# Patient Record
Sex: FEMALE | Race: WHITE | NOT HISPANIC OR LATINO | Employment: FULL TIME | URBAN - METROPOLITAN AREA
[De-identification: names, ages, dates, MRNs, and addresses within clinical notes are randomized per-mention and may not be internally consistent; named-entity substitution may affect disease eponyms.]

---

## 2018-03-26 ENCOUNTER — TRANSFERRED RECORDS (OUTPATIENT)
Dept: HEALTH INFORMATION MANAGEMENT | Facility: CLINIC | Age: 28
End: 2018-03-26

## 2018-10-22 ENCOUNTER — HOSPITAL (OUTPATIENT)
Dept: OTHER | Age: 28
End: 2018-10-22
Attending: EMERGENCY MEDICINE

## 2018-10-22 LAB — HCG POINT OF CARE (5HGRST): NEGATIVE

## 2021-12-15 ENCOUNTER — TRANSFERRED RECORDS (OUTPATIENT)
Dept: HEALTH INFORMATION MANAGEMENT | Facility: CLINIC | Age: 31
End: 2021-12-15
Payer: COMMERCIAL

## 2021-12-16 DIAGNOSIS — G90.A POTS (POSTURAL ORTHOSTATIC TACHYCARDIA SYNDROME): Primary | ICD-10-CM

## 2021-12-17 ENCOUNTER — TRANSFERRED RECORDS (OUTPATIENT)
Dept: HEALTH INFORMATION MANAGEMENT | Facility: CLINIC | Age: 31
End: 2021-12-17
Payer: COMMERCIAL

## 2022-02-02 ENCOUNTER — OFFICE VISIT (OUTPATIENT)
Dept: CARDIOLOGY | Facility: CLINIC | Age: 32
End: 2022-02-02
Payer: COMMERCIAL

## 2022-02-02 VITALS
HEART RATE: 106 BPM | SYSTOLIC BLOOD PRESSURE: 137 MMHG | BODY MASS INDEX: 22.66 KG/M2 | HEIGHT: 65 IN | DIASTOLIC BLOOD PRESSURE: 84 MMHG | WEIGHT: 136 LBS

## 2022-02-02 DIAGNOSIS — R01.1 SYSTOLIC MURMUR: ICD-10-CM

## 2022-02-02 DIAGNOSIS — R00.2 PALPITATIONS: Primary | ICD-10-CM

## 2022-02-02 DIAGNOSIS — G90.A POTS (POSTURAL ORTHOSTATIC TACHYCARDIA SYNDROME): ICD-10-CM

## 2022-02-02 PROCEDURE — 99204 OFFICE O/P NEW MOD 45 MIN: CPT | Performed by: INTERNAL MEDICINE

## 2022-02-02 PROCEDURE — 93000 ELECTROCARDIOGRAM COMPLETE: CPT | Performed by: INTERNAL MEDICINE

## 2022-02-02 RX ORDER — ONDANSETRON 8 MG/1
TABLET, FILM COATED ORAL EVERY 8 HOURS PRN
COMMUNITY

## 2022-02-02 RX ORDER — ATORVASTATIN CALCIUM 20 MG/1
TABLET, FILM COATED ORAL
COMMUNITY
Start: 2020-03-06

## 2022-02-02 RX ORDER — FEXOFENADINE HCL 180 MG/1
180 TABLET ORAL DAILY
COMMUNITY
End: 2022-05-03

## 2022-02-02 RX ORDER — SULFASALAZINE 500 MG/1
500 TABLET, DELAYED RELEASE ORAL 4 TIMES DAILY
COMMUNITY

## 2022-02-02 RX ORDER — FOLIC ACID 1 MG/1
1 TABLET ORAL DAILY
COMMUNITY
End: 2022-05-03 | Stop reason: DRUGHIGH

## 2022-02-02 RX ORDER — LANOLIN ALCOHOL/MO/W.PET/CERES
100 CREAM (GRAM) TOPICAL DAILY
COMMUNITY

## 2022-02-02 RX ORDER — SENNOSIDES 8.6 MG
650 CAPSULE ORAL EVERY 8 HOURS PRN
COMMUNITY

## 2022-02-02 RX ORDER — SUCRALFATE 1 G/1
1 TABLET ORAL 4 TIMES DAILY PRN
COMMUNITY

## 2022-02-02 RX ORDER — UBROGEPANT 100 MG/1
100 TABLET ORAL
COMMUNITY

## 2022-02-02 RX ORDER — LEVOTHYROXINE SODIUM 75 UG/1
75 TABLET ORAL DAILY
COMMUNITY
End: 2022-05-03 | Stop reason: DRUGHIGH

## 2022-02-02 RX ORDER — CEVIMELINE HYDROCHLORIDE 30 MG/1
30 CAPSULE ORAL 2 TIMES DAILY
COMMUNITY

## 2022-02-02 RX ORDER — LIOTHYRONINE SODIUM 5 UG/1
5 TABLET ORAL DAILY
COMMUNITY

## 2022-02-02 RX ORDER — LEVALBUTEROL TARTRATE 45 UG/1
2 AEROSOL, METERED ORAL
COMMUNITY

## 2022-02-02 RX ORDER — BACLOFEN 10 MG/1
10 TABLET ORAL 2 TIMES DAILY
COMMUNITY

## 2022-02-02 ASSESSMENT — MIFFLIN-ST. JEOR: SCORE: 1332.77

## 2022-02-02 NOTE — LETTER
2/2/2022    Rian Chavez MD  [address]  [address]  [facility] [address]    RE: Shauna Stout       Dear Colleague,     I had the pleasure of seeing Shauna Stout in the [facility].  HPI and Plan:   See dictation    Orders Placed This Encounter   Procedures     Follow-Up with Cardiology     EKG 12-lead complete w/read - Clinics (performed today)     Echocardiogram Complete     Orders Placed This Encounter   Medications     levothyroxine (SYNTHROID/LEVOTHROID) 75 MCG tablet     Sig: Take 75 mcg by mouth daily     liothyronine (CYTOMEL) 5 MCG tablet     Sig: Take 5 mcg by mouth daily     fluticasone-salmeterol (ADVAIR) 500-50 MCG/DOSE inhaler     Sig: Inhale 1 puff into the lungs every 12 hours     sulfaSALAzine ER (AZULFIDINE EN) 500 MG EC tablet     Sig: Take 500 mg by mouth 4 times daily Two tabs, twce daily     baclofen (LIORESAL) 10 MG tablet     Sig: Take 10 mg by mouth 2 times daily     cevimeline (EVOXAC) 30 MG capsule     Sig: Take 30 mg by mouth 2 times daily     fexofenadine (ALLEGRA ALLERGY) 180 MG tablet     Sig: Take 180 mg by mouth daily     atorvastatin (LIPITOR) 20 MG tablet     Sig: TK 1 T PO QD     levalbuterol (XOPENEX HFA) 45 MCG/ACT inhaler     Sig: Inhale 2 puffs into the lungs     ubrogepant (UBRELVY) 100 MG tablet     Sig: Take 100 mg by mouth at onset of headache     ondansetron (ZOFRAN) 8 MG tablet     Sig: Take by mouth every 8 hours as needed for nausea     aspirin-acetaminophen-caffeine (EXCEDRIN MIGRAINE) 250-250-65 MG tablet     Sig: Take 1 tablet by mouth every 6 hours as needed for headaches     KETOROLAC-BUPIV-KETAMINE IJ     Sig: Inject as directed daily as needed     sucralfate (CARAFATE) 1 GM tablet     Sig: Take 1 g by mouth 4 times daily as needed     acetaminophen (TYLENOL) 650 MG CR tablet     Sig: Take 650 mg by mouth every 8 hours as needed for mild pain or fever     Menaquinone-7 (VITAMIN K2 PO)     Sig: Take by mouth daily      Cholecalciferol (VITAMIN D3 PO)     Sig: Take by mouth daily     CYANOCOBALAMIN PO     Sig: Take 250 mcg by mouth daily     RIBOFLAVIN PO     Sig: Take 50 mg by mouth daily     folic acid (FOLVITE) 1 MG tablet     Sig: Take 1 mg by mouth daily     thiamine (B-1) 100 MG tablet     Sig: Take 100 mg by mouth daily     Ascorbic Acid (BRENTON-C PO)     Sig: Take 200 mg by mouth daily     Flaxseed, Linseed, (FLAXSEED OIL PO)     Sig: Take by mouth daily     Glycerin-Polysorbate 80 (REFRESH DRY EYE THERAPY OP)     Sig: Apply to eye daily as needed     There are no discontinued medications.      Encounter Diagnoses   Name Primary?     POTS (postural orthostatic tachycardia syndrome)      Palpitations Yes     Systolic murmur        CURRENT MEDICATIONS:  Current Outpatient Medications   Medication Sig Dispense Refill     acetaminophen (TYLENOL) 650 MG CR tablet Take 650 mg by mouth every 8 hours as needed for mild pain or fever       Ascorbic Acid (BRENTON-C PO) Take 200 mg by mouth daily       aspirin-acetaminophen-caffeine (EXCEDRIN MIGRAINE) 250-250-65 MG tablet Take 1 tablet by mouth every 6 hours as needed for headaches       atorvastatin (LIPITOR) 20 MG tablet TK 1 T PO QD       baclofen (LIORESAL) 10 MG tablet Take 10 mg by mouth 2 times daily       cevimeline (EVOXAC) 30 MG capsule Take 30 mg by mouth 2 times daily       Cholecalciferol (VITAMIN D3 PO) Take by mouth daily       CYANOCOBALAMIN PO Take 250 mcg by mouth daily       fexofenadine (ALLEGRA ALLERGY) 180 MG tablet Take 180 mg by mouth daily       Flaxseed, Linseed, (FLAXSEED OIL PO) Take by mouth daily       fluticasone-salmeterol (ADVAIR) 500-50 MCG/DOSE inhaler Inhale 1 puff into the lungs every 12 hours       folic acid (FOLVITE) 1 MG tablet Take 1 mg by mouth daily       Glycerin-Polysorbate 80 (REFRESH DRY EYE THERAPY OP) Apply to eye daily as needed       KETOROLAC-BUPIV-KETAMINE IJ Inject as directed daily as needed       levalbuterol (XOPENEX HFA) 45  MCG/ACT inhaler Inhale 2 puffs into the lungs       levothyroxine (SYNTHROID/LEVOTHROID) 75 MCG tablet Take 75 mcg by mouth daily       liothyronine (CYTOMEL) 5 MCG tablet Take 5 mcg by mouth daily       Menaquinone-7 (VITAMIN K2 PO) Take by mouth daily       ondansetron (ZOFRAN) 8 MG tablet Take by mouth every 8 hours as needed for nausea       RIBOFLAVIN PO Take 50 mg by mouth daily       sucralfate (CARAFATE) 1 GM tablet Take 1 g by mouth 4 times daily as needed       sulfaSALAzine ER (AZULFIDINE EN) 500 MG EC tablet Take 500 mg by mouth 4 times daily Two tabs, twce daily       thiamine (B-1) 100 MG tablet Take 100 mg by mouth daily       ubrogepant (UBRELVY) 100 MG tablet Take 100 mg by mouth at onset of headache         ALLERGIES   No Known Allergies    PAST MEDICAL HISTORY:  Past Medical History:   Diagnosis Date     Anxiety      Asthma      Duodenal ulcer without hemorrhage or perforation and without obstruction     due to nsaid     Familial hypercholesterolemia      Fibroid uterus      Hypothyroidism     on synthroid     MCTD (mixed connective tissue disease) (H)      Migraine      Raynaud's syndrome      Sjogren's disease (H)        PAST SURGICAL HISTORY:  Past Surgical History:   Procedure Laterality Date     eye tear duct plug       TONSILLECTOMY       TYMPANOPLASTY         FAMILY HISTORY:  Family History   Problem Relation Age of Onset     Hyperlipidemia Mother      Hypertension Mother      Thyroid Disease Mother      Tremor Mother      Chronic Obstructive Pulmonary Disease Father      Aneurysm Father        SOCIAL HISTORY:  Social History     Socioeconomic History     Marital status: Single     Spouse name: None     Number of children: None     Years of education: None     Highest education level: None   Occupational History     Comment: soft ware developement   Tobacco Use     Smoking status: Never Smoker     Smokeless tobacco: Never Used   Substance and Sexual Activity     Alcohol use: Yes      "Comment: occassional     Drug use: None     Comment: caffeine 1/d  chocolate 1/d     Sexual activity: None   Other Topics Concern     Parent/sibling w/ CABG, MI or angioplasty before 65F 55M? Not Asked   Social History Narrative     None     Social Determinants of Health     Financial Resource Strain: Not on file   Food Insecurity: Not on file   Transportation Needs: Not on file   Physical Activity: Not on file   Stress: Not on file   Social Connections: Not on file   Intimate Partner Violence: Not on file   Housing Stability: Not on file       Review of Systems:  Skin:        Eyes:  Positive for glasses  ENT:       Respiratory:  Positive for shortness of breath  Cardiovascular:  chest pain;Negative for Positive for;palpitations;lightheadedness  Gastroenterology: Negative    Genitourinary:  Negative    Musculoskeletal:  Negative    Neurologic:  Positive for headaches;migraine headaches  Psychiatric:  Negative    Heme/Lymph/Imm:  Negative    Endocrine:  Positive for thyroid disorder    Physical Exam:  Vitals: /84   Pulse 106   Ht 1.651 m (5' 5\")   Wt 61.7 kg (136 lb)   BMI 22.63 kg/m      Constitutional:  cooperative, alert and oriented, well developed, well nourished, in no acute distress        Skin:  warm and dry to the touch, no apparent skin lesions or masses noted          Head:  normocephalic, no masses or lesions        Eyes:  pupils equal and round, conjunctivae and lids unremarkable, sclera white, no xanthalasma, EOMS intact, no nystagmus        Lymph:No Cervical lymphadenopathy present;No thyromegaly     ENT:           Neck:  carotid pulses are full and equal bilaterally, JVP normal, no carotid bruit        Respiratory:  normal breath sounds, clear to auscultation, normal A-P diameter, normal symmetry, normal respiratory excursion, no use of accessory muscles         Cardiac: regular rhythm;normal S1 and S2       systolic ejection murmur     systolic murmur at pulmonic area supine but not heard " sitting up  pulses full and equal, no bruits auscultated                                        GI:  abdomen soft, non-tender, BS normoactive, no mass, no HSM, no bruits        Extremities and Muscular Skeletal:  no deformities, clubbing, cyanosis, erythema observed;no edema              Neurological:  no gross motor deficits        Psych:  Alert and Oriented x 3      Recent Lab Results:  LIPID RESULTS:  No results found for: CHOL, HDL, LDL, TRIG, CHOLHDLRATIO    LIVER ENZYME RESULTS:  No results found for: AST, ALT    CBC RESULTS:  No results found for: WBC, RBC, HGB, HCT, MCV, MCH, MCHC, RDW, PLT    BMP RESULTS:  No results found for: NA, POTASSIUM, CHLORIDE, CO2, ANIONGAP, GLC, BUN, CR, GFRESTIMATED, GFRESTBLACK, ERICKSON     A1C RESULTS:  No results found for: A1C    INR RESULTS:  No results found for: INR        CC  Rian Chavez MD  [address]  [address]  [place] [address]      Thank you for allowing me to participate in the care of your patient.      Sincerely,     Laureano Hanna MD     [facility]  cc:   Rian Chavez MD  [address]  [address]  [place] [address]

## 2022-02-02 NOTE — PROGRESS NOTES
Service Date: 02/02/2022    Shauna Stout is a most delightful 31-year-old woman.  She used to live in [place].  She was referred in for possible POTS syndrome.  She has a longstanding history of a mixed connective tissue disease or similar.  She has had occasional lightheaded spells but no actual syncope going back to her youth.  She had been seen in [place] with a cardiologist.  She had an echocardiogram back in 2018 that showed no structural heart disease and important as no connection with the heart.  No valvular disease and no myxoma, which can sometimes be associated with collagen vascular symptoms.  The patient states that sometimes when she is walking downtown [place] she will feel lightheaded and will have to sit down.  She notes if she sits down right away her symptoms will pass. Again, there has been no gayatri syncope.  She has completely normal exercise tolerance.  No real irregular heartbeat except for the higher heartbeat when she is upright.  No fluid retention, etc.  Her past medical history has been outlined.  The pertinent findings on exam is clinically I do hear a soft murmur over the pulmonic area when she is supine but not sitting up.  This is most consistent with a pulmonary outflow murmur.  Supine left arm blood pressure 124/71, heart rate 84.  Standing at 1 minute, blood pressure 119/70 and at 2 minutes 112/70 with a heart rate of 107.  Interestingly, though, at 5 minutes, I rechecked the left and the right arm.  The left arm was 138/74 and the right arm was 140/70 with a heart rate of 108, so therefore, her blood pressure is similar in both arms.  She did have a slight relative tachycardia with her heart rate going up about 23-24 point above resting supine when she is standing.  The blood pressure when it dropped was really minimal, but her blood pressure actually went up a little higher after several minutes.  She was not particularly symptomatic with any of this.  Extensive work was done  through [facility] and I reviewed all those tests.  There was a significant amount of blood work done and I thank them in advance.  As I mentioned, I reviewed the echocardiogram from [place] and the local blood tests here have been quite extensive. Briefly, iron studies are in the normal range.  Cholesterol panel is in the normal range, even though they list hyperlipidemia as a diagnosis.  Her LDL was 97, HDL 65, triglycerides 60.  Her blood sugar was normal.  Electrolyte panel including creatinine is normal.  Liver panel and albumin are normal.  PTH normal.  TSH was minimally elevated at 5.6 with a normal T3 and T4, so at most, this is compensated hypothyroidism, ACTH, histamine and cortisol were done to excluded adrenal insufficiency or overactive adrenal gland.  Multiple vitamins were done, and for the most part, vitamin studies were all normal.  At this point, we can give her a tentative diagnosis of POTS syndrome.  Her heart rate did go up more than 20 points to over 100 with only a mild change in the blood pressure, although this is a relatively mild case.  Her episodes of lightheadedness and feeling that she has to stop are actually few and far between.  We talked about the usual standard maneuvers if she feels lightheaded to simply sit down or if she can and is upright to march in place to increase the venous return.  We talked about some of the pathophysiology of POTS.  We talked about potential therapies that include Florinef, beta blockers, Mestinon, Corlanor, support stockings, salt.  At this point, I would either do nothing or liberalize salt in her diet and see how she feels.  The episodes are so far in between I do not know if it is necessary to put medications on regularly for symptoms that are relatively few and far between.  I would like to see her back in 3 months.  I will do an echocardiogram just to make sure that murmur is an innocent murmur.  We will also do a bubble study to exclude a  PFO.  She does have a history of migraine headache.  I am certain that she would not have a myxoma. They would not have missed that on the previous echo, but I only bring that up because myxomas often can mimic collagen vascular symptoms.    Today's visit was 54 minutes, including the time to review the outside records.    Laureano Hanna MD    cc:  Rian Chavez MD  [facility]  [address] [place] [address] [facility], [address] [place]  [address]      Laureano Hanna MD        D: 2022   T: 2022   MT: emil    Name:     DANIELLA MARIN  MRN:      [id number]        Account:      [id number]   :      1990           Service Date: 2022       Document: [id number]

## 2022-02-02 NOTE — PROGRESS NOTES
HPI and Plan:   See dictation    Orders Placed This Encounter   Procedures     Follow-Up with Cardiology     EKG 12-lead complete w/read - Clinics (performed today)     Echocardiogram Complete     Orders Placed This Encounter   Medications     levothyroxine (SYNTHROID/LEVOTHROID) 75 MCG tablet     Sig: Take 75 mcg by mouth daily     liothyronine (CYTOMEL) 5 MCG tablet     Sig: Take 5 mcg by mouth daily     fluticasone-salmeterol (ADVAIR) 500-50 MCG/DOSE inhaler     Sig: Inhale 1 puff into the lungs every 12 hours     sulfaSALAzine ER (AZULFIDINE EN) 500 MG EC tablet     Sig: Take 500 mg by mouth 4 times daily Two tabs, twce daily     baclofen (LIORESAL) 10 MG tablet     Sig: Take 10 mg by mouth 2 times daily     cevimeline (EVOXAC) 30 MG capsule     Sig: Take 30 mg by mouth 2 times daily     fexofenadine (ALLEGRA ALLERGY) 180 MG tablet     Sig: Take 180 mg by mouth daily     atorvastatin (LIPITOR) 20 MG tablet     Sig: TK 1 T PO QD     levalbuterol (XOPENEX HFA) 45 MCG/ACT inhaler     Sig: Inhale 2 puffs into the lungs     ubrogepant (UBRELVY) 100 MG tablet     Sig: Take 100 mg by mouth at onset of headache     ondansetron (ZOFRAN) 8 MG tablet     Sig: Take by mouth every 8 hours as needed for nausea     aspirin-acetaminophen-caffeine (EXCEDRIN MIGRAINE) 250-250-65 MG tablet     Sig: Take 1 tablet by mouth every 6 hours as needed for headaches     KETOROLAC-BUPIV-KETAMINE IJ     Sig: Inject as directed daily as needed     sucralfate (CARAFATE) 1 GM tablet     Sig: Take 1 g by mouth 4 times daily as needed     acetaminophen (TYLENOL) 650 MG CR tablet     Sig: Take 650 mg by mouth every 8 hours as needed for mild pain or fever     Menaquinone-7 (VITAMIN K2 PO)     Sig: Take by mouth daily     Cholecalciferol (VITAMIN D3 PO)     Sig: Take by mouth daily     CYANOCOBALAMIN PO     Sig: Take 250 mcg by mouth daily     RIBOFLAVIN PO     Sig: Take 50 mg by mouth daily     folic acid (FOLVITE) 1 MG tablet     Sig: Take 1 mg  by mouth daily     thiamine (B-1) 100 MG tablet     Sig: Take 100 mg by mouth daily     Ascorbic Acid (BRENTON-C PO)     Sig: Take 200 mg by mouth daily     Flaxseed, Linseed, (FLAXSEED OIL PO)     Sig: Take by mouth daily     Glycerin-Polysorbate 80 (REFRESH DRY EYE THERAPY OP)     Sig: Apply to eye daily as needed     There are no discontinued medications.      Encounter Diagnoses   Name Primary?     POTS (postural orthostatic tachycardia syndrome)      Palpitations Yes     Systolic murmur        CURRENT MEDICATIONS:  Current Outpatient Medications   Medication Sig Dispense Refill     acetaminophen (TYLENOL) 650 MG CR tablet Take 650 mg by mouth every 8 hours as needed for mild pain or fever       Ascorbic Acid (BRENTON-C PO) Take 200 mg by mouth daily       aspirin-acetaminophen-caffeine (EXCEDRIN MIGRAINE) 250-250-65 MG tablet Take 1 tablet by mouth every 6 hours as needed for headaches       atorvastatin (LIPITOR) 20 MG tablet TK 1 T PO QD       baclofen (LIORESAL) 10 MG tablet Take 10 mg by mouth 2 times daily       cevimeline (EVOXAC) 30 MG capsule Take 30 mg by mouth 2 times daily       Cholecalciferol (VITAMIN D3 PO) Take by mouth daily       CYANOCOBALAMIN PO Take 250 mcg by mouth daily       fexofenadine (ALLEGRA ALLERGY) 180 MG tablet Take 180 mg by mouth daily       Flaxseed, Linseed, (FLAXSEED OIL PO) Take by mouth daily       fluticasone-salmeterol (ADVAIR) 500-50 MCG/DOSE inhaler Inhale 1 puff into the lungs every 12 hours       folic acid (FOLVITE) 1 MG tablet Take 1 mg by mouth daily       Glycerin-Polysorbate 80 (REFRESH DRY EYE THERAPY OP) Apply to eye daily as needed       KETOROLAC-BUPIV-KETAMINE IJ Inject as directed daily as needed       levalbuterol (XOPENEX HFA) 45 MCG/ACT inhaler Inhale 2 puffs into the lungs       levothyroxine (SYNTHROID/LEVOTHROID) 75 MCG tablet Take 75 mcg by mouth daily       liothyronine (CYTOMEL) 5 MCG tablet Take 5 mcg by mouth daily       Menaquinone-7 (VITAMIN K2 PO)  Take by mouth daily       ondansetron (ZOFRAN) 8 MG tablet Take by mouth every 8 hours as needed for nausea       RIBOFLAVIN PO Take 50 mg by mouth daily       sucralfate (CARAFATE) 1 GM tablet Take 1 g by mouth 4 times daily as needed       sulfaSALAzine ER (AZULFIDINE EN) 500 MG EC tablet Take 500 mg by mouth 4 times daily Two tabs, twce daily       thiamine (B-1) 100 MG tablet Take 100 mg by mouth daily       ubrogepant (UBRELVY) 100 MG tablet Take 100 mg by mouth at onset of headache         ALLERGIES   No Known Allergies    PAST MEDICAL HISTORY:  Past Medical History:   Diagnosis Date     Anxiety      Asthma      Duodenal ulcer without hemorrhage or perforation and without obstruction     due to nsaid     Familial hypercholesterolemia      Fibroid uterus      Hypothyroidism     on synthroid     MCTD (mixed connective tissue disease) (H)      Migraine      Raynaud's syndrome      Sjogren's disease (H)        PAST SURGICAL HISTORY:  Past Surgical History:   Procedure Laterality Date     eye tear duct plug       TONSILLECTOMY       TYMPANOPLASTY         FAMILY HISTORY:  Family History   Problem Relation Age of Onset     Hyperlipidemia Mother      Hypertension Mother      Thyroid Disease Mother      Tremor Mother      Chronic Obstructive Pulmonary Disease Father      Aneurysm Father        SOCIAL HISTORY:  Social History     Socioeconomic History     Marital status: Single     Spouse name: None     Number of children: None     Years of education: None     Highest education level: None   Occupational History     Comment: soft ware developement   Tobacco Use     Smoking status: Never Smoker     Smokeless tobacco: Never Used   Substance and Sexual Activity     Alcohol use: Yes     Comment: occassional     Drug use: None     Comment: caffeine 1/d  chocolate 1/d     Sexual activity: None   Other Topics Concern     Parent/sibling w/ CABG, MI or angioplasty before 65F 55M? Not Asked   Social History Narrative     None  "    Social Determinants of Health     Financial Resource Strain: Not on file   Food Insecurity: Not on file   Transportation Needs: Not on file   Physical Activity: Not on file   Stress: Not on file   Social Connections: Not on file   Intimate Partner Violence: Not on file   Housing Stability: Not on file       Review of Systems:  Skin:        Eyes:  Positive for glasses  ENT:       Respiratory:  Positive for shortness of breath  Cardiovascular:  chest pain;Negative for Positive for;palpitations;lightheadedness  Gastroenterology: Negative    Genitourinary:  Negative    Musculoskeletal:  Negative    Neurologic:  Positive for headaches;migraine headaches  Psychiatric:  Negative    Heme/Lymph/Imm:  Negative    Endocrine:  Positive for thyroid disorder    Physical Exam:  Vitals: /84   Pulse 106   Ht 1.651 m (5' 5\")   Wt 61.7 kg (136 lb)   BMI 22.63 kg/m      Constitutional:  cooperative, alert and oriented, well developed, well nourished, in no acute distress        Skin:  warm and dry to the touch, no apparent skin lesions or masses noted          Head:  normocephalic, no masses or lesions        Eyes:  pupils equal and round, conjunctivae and lids unremarkable, sclera white, no xanthalasma, EOMS intact, no nystagmus        Lymph:No Cervical lymphadenopathy present;No thyromegaly     ENT:           Neck:  carotid pulses are full and equal bilaterally, JVP normal, no carotid bruit        Respiratory:  normal breath sounds, clear to auscultation, normal A-P diameter, normal symmetry, normal respiratory excursion, no use of accessory muscles         Cardiac: regular rhythm;normal S1 and S2       systolic ejection murmur     systolic murmur at pulmonic area supine but not heard sitting up  pulses full and equal, no bruits auscultated                                        GI:  abdomen soft, non-tender, BS normoactive, no mass, no HSM, no bruits        Extremities and Muscular Skeletal:  no deformities, clubbing, " cyanosis, erythema observed;no edema              Neurological:  no gross motor deficits        Psych:  Alert and Oriented x 3      Recent Lab Results:  LIPID RESULTS:  No results found for: CHOL, HDL, LDL, TRIG, CHOLHDLRATIO    LIVER ENZYME RESULTS:  No results found for: AST, ALT    CBC RESULTS:  No results found for: WBC, RBC, HGB, HCT, MCV, MCH, MCHC, RDW, PLT    BMP RESULTS:  No results found for: NA, POTASSIUM, CHLORIDE, CO2, ANIONGAP, GLC, BUN, CR, GFRESTIMATED, GFRESTBLACK, ERICKSON     A1C RESULTS:  No results found for: A1C    INR RESULTS:  No results found for: INR        CC  Rian Chavez MD  [address]  [address]  [place] [address]

## 2022-02-02 NOTE — LETTER
2/2/2022       RE: Shauna Stout  [address]  [facility] [address]     Dear Colleague,    Thank you for referring your patient, Shauna Stout, to the [facility] at [facility]. Please see a copy of my visit note below.    HPI and Plan:   See dictation    Orders Placed This Encounter   Procedures     Follow-Up with Cardiology     EKG 12-lead complete w/read - Clinics (performed today)     Echocardiogram Complete     Orders Placed This Encounter   Medications     levothyroxine (SYNTHROID/LEVOTHROID) 75 MCG tablet     Sig: Take 75 mcg by mouth daily     liothyronine (CYTOMEL) 5 MCG tablet     Sig: Take 5 mcg by mouth daily     fluticasone-salmeterol (ADVAIR) 500-50 MCG/DOSE inhaler     Sig: Inhale 1 puff into the lungs every 12 hours     sulfaSALAzine ER (AZULFIDINE EN) 500 MG EC tablet     Sig: Take 500 mg by mouth 4 times daily Two tabs, twce daily     baclofen (LIORESAL) 10 MG tablet     Sig: Take 10 mg by mouth 2 times daily     cevimeline (EVOXAC) 30 MG capsule     Sig: Take 30 mg by mouth 2 times daily     fexofenadine (ALLEGRA ALLERGY) 180 MG tablet     Sig: Take 180 mg by mouth daily     atorvastatin (LIPITOR) 20 MG tablet     Sig: TK 1 T PO QD     levalbuterol (XOPENEX HFA) 45 MCG/ACT inhaler     Sig: Inhale 2 puffs into the lungs     ubrogepant (UBRELVY) 100 MG tablet     Sig: Take 100 mg by mouth at onset of headache     ondansetron (ZOFRAN) 8 MG tablet     Sig: Take by mouth every 8 hours as needed for nausea     aspirin-acetaminophen-caffeine (EXCEDRIN MIGRAINE) 250-250-65 MG tablet     Sig: Take 1 tablet by mouth every 6 hours as needed for headaches     KETOROLAC-BUPIV-KETAMINE IJ     Sig: Inject as directed daily as needed     sucralfate (CARAFATE) 1 GM tablet     Sig: Take 1 g by mouth 4 times daily as needed     acetaminophen (TYLENOL) 650 MG CR tablet     Sig: Take 650 mg by mouth every 8 hours as needed for mild  pain or fever     Menaquinone-7 (VITAMIN K2 PO)     Sig: Take by mouth daily     Cholecalciferol (VITAMIN D3 PO)     Sig: Take by mouth daily     CYANOCOBALAMIN PO     Sig: Take 250 mcg by mouth daily     RIBOFLAVIN PO     Sig: Take 50 mg by mouth daily     folic acid (FOLVITE) 1 MG tablet     Sig: Take 1 mg by mouth daily     thiamine (B-1) 100 MG tablet     Sig: Take 100 mg by mouth daily     Ascorbic Acid (BRENTON-C PO)     Sig: Take 200 mg by mouth daily     Flaxseed, Linseed, (FLAXSEED OIL PO)     Sig: Take by mouth daily     Glycerin-Polysorbate 80 (REFRESH DRY EYE THERAPY OP)     Sig: Apply to eye daily as needed     There are no discontinued medications.      Encounter Diagnoses   Name Primary?     POTS (postural orthostatic tachycardia syndrome)      Palpitations Yes     Systolic murmur        CURRENT MEDICATIONS:  Current Outpatient Medications   Medication Sig Dispense Refill     acetaminophen (TYLENOL) 650 MG CR tablet Take 650 mg by mouth every 8 hours as needed for mild pain or fever       Ascorbic Acid (BRENTON-C PO) Take 200 mg by mouth daily       aspirin-acetaminophen-caffeine (EXCEDRIN MIGRAINE) 250-250-65 MG tablet Take 1 tablet by mouth every 6 hours as needed for headaches       atorvastatin (LIPITOR) 20 MG tablet TK 1 T PO QD       baclofen (LIORESAL) 10 MG tablet Take 10 mg by mouth 2 times daily       cevimeline (EVOXAC) 30 MG capsule Take 30 mg by mouth 2 times daily       Cholecalciferol (VITAMIN D3 PO) Take by mouth daily       CYANOCOBALAMIN PO Take 250 mcg by mouth daily       fexofenadine (ALLEGRA ALLERGY) 180 MG tablet Take 180 mg by mouth daily       Flaxseed, Linseed, (FLAXSEED OIL PO) Take by mouth daily       fluticasone-salmeterol (ADVAIR) 500-50 MCG/DOSE inhaler Inhale 1 puff into the lungs every 12 hours       folic acid (FOLVITE) 1 MG tablet Take 1 mg by mouth daily       Glycerin-Polysorbate 80 (REFRESH DRY EYE THERAPY OP) Apply to eye daily as needed       KETOROLAC-BUPIV-KETAMINE  IJ Inject as directed daily as needed       levalbuterol (XOPENEX HFA) 45 MCG/ACT inhaler Inhale 2 puffs into the lungs       levothyroxine (SYNTHROID/LEVOTHROID) 75 MCG tablet Take 75 mcg by mouth daily       liothyronine (CYTOMEL) 5 MCG tablet Take 5 mcg by mouth daily       Menaquinone-7 (VITAMIN K2 PO) Take by mouth daily       ondansetron (ZOFRAN) 8 MG tablet Take by mouth every 8 hours as needed for nausea       RIBOFLAVIN PO Take 50 mg by mouth daily       sucralfate (CARAFATE) 1 GM tablet Take 1 g by mouth 4 times daily as needed       sulfaSALAzine ER (AZULFIDINE EN) 500 MG EC tablet Take 500 mg by mouth 4 times daily Two tabs, twce daily       thiamine (B-1) 100 MG tablet Take 100 mg by mouth daily       ubrogepant (UBRELVY) 100 MG tablet Take 100 mg by mouth at onset of headache         ALLERGIES   No Known Allergies    PAST MEDICAL HISTORY:  Past Medical History:   Diagnosis Date     Anxiety      Asthma      Duodenal ulcer without hemorrhage or perforation and without obstruction     due to nsaid     Familial hypercholesterolemia      Fibroid uterus      Hypothyroidism     on synthroid     MCTD (mixed connective tissue disease) (H)      Migraine      Raynaud's syndrome      Sjogren's disease (H)        PAST SURGICAL HISTORY:  Past Surgical History:   Procedure Laterality Date     eye tear duct plug       TONSILLECTOMY       TYMPANOPLASTY         FAMILY HISTORY:  Family History   Problem Relation Age of Onset     Hyperlipidemia Mother      Hypertension Mother      Thyroid Disease Mother      Tremor Mother      Chronic Obstructive Pulmonary Disease Father      Aneurysm Father        SOCIAL HISTORY:  Social History     Socioeconomic History     Marital status: Single     Spouse name: None     Number of children: None     Years of education: None     Highest education level: None   Occupational History     Comment: soft ware developement   Tobacco Use     Smoking status: Never Smoker     Smokeless tobacco:  "Never Used   Substance and Sexual Activity     Alcohol use: Yes     Comment: occassional     Drug use: None     Comment: caffeine 1/d  chocolate 1/d     Sexual activity: None   Other Topics Concern     Parent/sibling w/ CABG, MI or angioplasty before 65F 55M? Not Asked   Social History Narrative     None     Social Determinants of Health     Financial Resource Strain: Not on file   Food Insecurity: Not on file   Transportation Needs: Not on file   Physical Activity: Not on file   Stress: Not on file   Social Connections: Not on file   Intimate Partner Violence: Not on file   Housing Stability: Not on file       Review of Systems:  Skin:        Eyes:  Positive for glasses  ENT:       Respiratory:  Positive for shortness of breath  Cardiovascular:  chest pain;Negative for Positive for;palpitations;lightheadedness  Gastroenterology: Negative    Genitourinary:  Negative    Musculoskeletal:  Negative    Neurologic:  Positive for headaches;migraine headaches  Psychiatric:  Negative    Heme/Lymph/Imm:  Negative    Endocrine:  Positive for thyroid disorder    Physical Exam:  Vitals: /84   Pulse 106   Ht 1.651 m (5' 5\")   Wt 61.7 kg (136 lb)   BMI 22.63 kg/m      Constitutional:  cooperative, alert and oriented, well developed, well nourished, in no acute distress        Skin:  warm and dry to the touch, no apparent skin lesions or masses noted          Head:  normocephalic, no masses or lesions        Eyes:  pupils equal and round, conjunctivae and lids unremarkable, sclera white, no xanthalasma, EOMS intact, no nystagmus        Lymph:No Cervical lymphadenopathy present;No thyromegaly     ENT:           Neck:  carotid pulses are full and equal bilaterally, JVP normal, no carotid bruit        Respiratory:  normal breath sounds, clear to auscultation, normal A-P diameter, normal symmetry, normal respiratory excursion, no use of accessory muscles         Cardiac: regular rhythm;normal S1 and S2       systolic " ejection murmur     systolic murmur at pulmonic area supine but not heard sitting up  pulses full and equal, no bruits auscultated                                        GI:  abdomen soft, non-tender, BS normoactive, no mass, no HSM, no bruits        Extremities and Muscular Skeletal:  no deformities, clubbing, cyanosis, erythema observed;no edema              Neurological:  no gross motor deficits        Psych:  Alert and Oriented x 3      Recent Lab Results:  LIPID RESULTS:  No results found for: CHOL, HDL, LDL, TRIG, CHOLHDLRATIO    LIVER ENZYME RESULTS:  No results found for: AST, ALT    CBC RESULTS:  No results found for: WBC, RBC, HGB, HCT, MCV, MCH, MCHC, RDW, PLT    BMP RESULTS:  No results found for: NA, POTASSIUM, CHLORIDE, CO2, ANIONGAP, GLC, BUN, CR, GFRESTIMATED, GFRESTBLACK, ERICKSON     A1C RESULTS:  No results found for: A1C    INR RESULTS:  No results found for: INR        CC  Rian Chavez MD  [address] [facility]  [address] [place] [id number]    Service Date: 02/02/2022    Shauna Stout is a most delightful 31-year-old woman.  She used to live in [place].  She was referred in for possible POTS syndrome.  She has a longstanding history of a mixed connective tissue disease or similar.  She has had occasional lightheaded spells but no actual syncope going back to her youth.  She had been seen in [place] with a cardiologist.  She had an echocardiogram back in 2018 that showed no structural heart disease and important as no connection with the heart.  No valvular disease and no myxoma, which can sometimes be associated with collagen vascular symptoms.  The patient states that sometimes when she is walking downtown [place] she will feel lightheaded and will have to sit down.  She notes if she sits down right away her symptoms will pass. Again, there has been no gayatri syncope.  She has completely normal exercise tolerance.  No real irregular heartbeat except for the higher heartbeat when she is upright.  No  fluid retention, etc.  Her past medical history has been outlined.  The pertinent findings on exam is clinically I do hear a soft murmur over the pulmonic area when she is supine but not sitting up.  This is most consistent with a pulmonary outflow murmur.  Supine left arm blood pressure 124/71, heart rate 84.  Standing at 1 minute, blood pressure 119/70 and at 2 minutes 112/70 with a heart rate of 107.  Interestingly, though, at 5 minutes, I rechecked the left and the right arm.  The left arm was 138/74 and the right arm was 140/70 with a heart rate of 108, so therefore, her blood pressure is similar in both arms.  She did have a slight relative tachycardia with her heart rate going up about 23-24 point above resting supine when she is standing.  The blood pressure when it dropped was really minimal, but her blood pressure actually went up a little higher after several minutes.  She was not particularly symptomatic with any of this.  Extensive work was done through [facility] and I reviewed all those tests.  There was a significant amount of blood work done and I thank them in advance.  As I mentioned, I reviewed the echocardiogram from [place] and the local blood tests here have been quite extensive. Briefly, iron studies are in the normal range.  Cholesterol panel is in the normal range, even though they list hyperlipidemia as a diagnosis.  Her LDL was 97, HDL 65, triglycerides 60.  Her blood sugar was normal.  Electrolyte panel including creatinine is normal.  Liver panel and albumin are normal.  PTH normal.  TSH was minimally elevated at 5.6 with a normal T3 and T4, so at most, this is compensated hypothyroidism, ACTH, histamine and cortisol were done to excluded adrenal insufficiency or overactive adrenal gland.  Multiple vitamins were done, and for the most part, vitamin studies were all normal.  At this point, we can give her a tentative diagnosis of POTS syndrome.  Her heart rate did go up more than  20 points to over 100 with only a mild change in the blood pressure, although this is a relatively mild case.  Her episodes of lightheadedness and feeling that she has to stop are actually few and far between.  We talked about the usual standard maneuvers if she feels lightheaded to simply sit down or if she can and is upright to march in place to increase the venous return.  We talked about some of the pathophysiology of POTS.  We talked about potential therapies that include Florinef, beta blockers, Mestinon, Corlanor, support stockings, salt.  At this point, I would either do nothing or liberalize salt in her diet and see how she feels.  The episodes are so far in between I do not know if it is necessary to put medications on regularly for symptoms that are relatively few and far between.  I would like to see her back in 3 months.  I will do an echocardiogram just to make sure that murmur is an innocent murmur.  We will also do a bubble study to exclude a PFO.  She does have a history of migraine headache.  I am certain that she would not have a myxoma. They would not have missed that on the previous echo, but I only bring that up because myxomas often can mimic collagen vascular symptoms.    Today's visit was 54 minutes, including the time to review the outside records.    Laureano Hanna MD    cc:  Rian Chavez MD  [facility]  [address], [address]  [address]          D: 2022   T: 2022   MT: emil    Name:     DANIELLA MARIN  MRN:      [id number]        Account:      [id number]   :      1990           Service Date: 2022     Document: [id number]

## 2022-02-13 ENCOUNTER — HEALTH MAINTENANCE LETTER (OUTPATIENT)
Age: 32
End: 2022-02-13

## 2022-04-29 ENCOUNTER — HOSPITAL ENCOUNTER (OUTPATIENT)
Dept: CARDIOLOGY | Facility: CLINIC | Age: 32
Discharge: HOME | End: 2022-04-29
Attending: INTERNAL MEDICINE | Admitting: INTERNAL MEDICINE
Payer: COMMERCIAL

## 2022-04-29 DIAGNOSIS — G90.A POTS (POSTURAL ORTHOSTATIC TACHYCARDIA SYNDROME): ICD-10-CM

## 2022-04-29 DIAGNOSIS — R01.1 SYSTOLIC MURMUR: Primary | ICD-10-CM

## 2022-04-29 LAB — LVEF ECHO: NORMAL

## 2022-04-29 PROCEDURE — 93306 TTE W/DOPPLER COMPLETE: CPT | Mod: 26 | Performed by: INTERNAL MEDICINE

## 2022-04-29 PROCEDURE — 999N000208 ECHOCARDIOGRAM COMPLETE

## 2022-05-03 ENCOUNTER — OFFICE VISIT (OUTPATIENT)
Dept: CARDIOLOGY | Facility: CLINIC | Age: 32
End: 2022-05-03
Attending: INTERNAL MEDICINE
Payer: COMMERCIAL

## 2022-05-03 VITALS — HEIGHT: 65 IN | WEIGHT: 143 LBS | BODY MASS INDEX: 23.82 KG/M2

## 2022-05-03 DIAGNOSIS — G90.A POTS (POSTURAL ORTHOSTATIC TACHYCARDIA SYNDROME): ICD-10-CM

## 2022-05-03 DIAGNOSIS — R01.1 SYSTOLIC MURMUR: ICD-10-CM

## 2022-05-03 PROCEDURE — 99207 PR NO DOCUMENTATION ON VISIT: CPT | Performed by: INTERNAL MEDICINE

## 2022-05-03 RX ORDER — NIACIN 500 MG
TABLET ORAL
COMMUNITY

## 2022-05-03 RX ORDER — CHOLECALCIFEROL (VITAMIN D3) 1250 MCG
1250 CAPSULE ORAL
COMMUNITY

## 2022-05-03 RX ORDER — LEVOTHYROXINE SODIUM 88 UG/1
88 TABLET ORAL DAILY
COMMUNITY

## 2022-05-03 NOTE — PROGRESS NOTES
HPI and Plan:   See dictation    No orders of the defined types were placed in this encounter.    Orders Placed This Encounter   Medications     levothyroxine (SYNTHROID/LEVOTHROID) 88 MCG tablet     Sig: Take 88 mcg by mouth daily     cholecalciferol (VITAMIN D3) 1250 mcg (17487 units) capsule     Sig: Take 1,250 mcg by mouth every 7 days     UNABLE TO FIND     Sig: Take 5,000 mcg by mouth daily MEDICATION NAME: Methyl folate     niacin 500 MG tablet     Sig: Take by mouth daily (with breakfast)     Medications Discontinued During This Encounter   Medication Reason     fexofenadine (ALLEGRA ALLERGY) 180 MG tablet Stopped by Patient     levothyroxine (SYNTHROID/LEVOTHROID) 75 MCG tablet Dose adjustment     Cholecalciferol (VITAMIN D3 PO) Dose adjustment     folic acid (FOLVITE) 1 MG tablet Dose adjustment         Encounter Diagnoses   Name Primary?     POTS (postural orthostatic tachycardia syndrome)      Systolic murmur        CURRENT MEDICATIONS:  Current Outpatient Medications   Medication Sig Dispense Refill     acetaminophen (TYLENOL) 650 MG CR tablet Take 650 mg by mouth every 8 hours as needed for mild pain or fever       Ascorbic Acid (BRENTON-C PO) Take 200 mg by mouth daily       aspirin-acetaminophen-caffeine (EXCEDRIN MIGRAINE) 250-250-65 MG tablet Take 1 tablet by mouth every 6 hours as needed for headaches       atorvastatin (LIPITOR) 20 MG tablet TK 1 T PO QD       baclofen (LIORESAL) 10 MG tablet Take 10 mg by mouth 2 times daily       cevimeline (EVOXAC) 30 MG capsule Take 30 mg by mouth 2 times daily       cholecalciferol (VITAMIN D3) 1250 mcg (55226 units) capsule Take 1,250 mcg by mouth every 7 days       CYANOCOBALAMIN PO Take 250 mcg by mouth daily       Flaxseed, Linseed, (FLAXSEED OIL PO) Take by mouth daily       fluticasone-salmeterol (ADVAIR) 500-50 MCG/DOSE inhaler Inhale 1 puff into the lungs every 12 hours       Glycerin-Polysorbate 80 (REFRESH DRY EYE THERAPY OP) Apply to eye daily as  needed       KETOROLAC-BUPIV-KETAMINE IJ Inject as directed daily as needed       levalbuterol (XOPENEX HFA) 45 MCG/ACT inhaler Inhale 2 puffs into the lungs       levothyroxine (SYNTHROID/LEVOTHROID) 88 MCG tablet Take 88 mcg by mouth daily       liothyronine (CYTOMEL) 5 MCG tablet Take 5 mcg by mouth daily       Menaquinone-7 (VITAMIN K2 PO) Take by mouth once a week       niacin 500 MG tablet Take by mouth daily (with breakfast)       ondansetron (ZOFRAN) 8 MG tablet Take by mouth every 8 hours as needed for nausea       RIBOFLAVIN PO Take 100 mg by mouth daily       sucralfate (CARAFATE) 1 GM tablet Take 1 g by mouth 4 times daily as needed       sulfaSALAzine ER (AZULFIDINE EN) 500 MG EC tablet Take 500 mg by mouth 4 times daily Two tabs, twce daily       thiamine (B-1) 100 MG tablet Take 100 mg by mouth daily       ubrogepant (UBRELVY) 100 MG tablet Take 100 mg by mouth at onset of headache       UNABLE TO FIND Take 5,000 mcg by mouth daily MEDICATION NAME: Methyl folate         ALLERGIES     Allergies   Allergen Reactions     Azithromycin      Gets C-diff     Pneumococcal Vaccines Hives     Singulair [Montelukast] Headache     Amoxicillin Rash     Rinvoq [Upadacitinib] Rash       PAST MEDICAL HISTORY:  Past Medical History:   Diagnosis Date     Anxiety      Asthma      Duodenal ulcer without hemorrhage or perforation and without obstruction     due to nsaid     Familial hypercholesterolemia      Fibroid uterus      Hypothyroidism     on synthroid     MCTD (mixed connective tissue disease) (H)      Migraine      Raynaud's syndrome      Sjogren's disease (H)        PAST SURGICAL HISTORY:  Past Surgical History:   Procedure Laterality Date     eye tear duct plug       TONSILLECTOMY       TYMPANOPLASTY         FAMILY HISTORY:  Family History   Problem Relation Age of Onset     Hyperlipidemia Mother      Hypertension Mother      Thyroid Disease Mother      Tremor Mother      Chronic Obstructive Pulmonary Disease  "Father      Aneurysm Father        SOCIAL HISTORY:  Social History     Socioeconomic History     Marital status: Single     Spouse name: None     Number of children: None     Years of education: None     Highest education level: None   Occupational History     Comment: soft ware developement   Tobacco Use     Smoking status: Never Smoker     Smokeless tobacco: Never Used   Substance and Sexual Activity     Alcohol use: Yes     Comment: occassional       Review of Systems:  Skin:  Negative     Eyes:  Positive for glasses  ENT:  Negative    Respiratory:  Negative    Cardiovascular:    Positive for;lightheadedness;dizziness;syncope or near-syncope  Gastroenterology: Negative    Genitourinary:  Negative    Musculoskeletal:  Positive for back pain;arthritis  Neurologic:  Negative    Psychiatric:  Negative    Heme/Lymph/Imm:  Positive for allergies  Endocrine:  Positive for thyroid disorder    Physical Exam:  Vitals: Ht 1.651 m (5' 5\")   Wt 64.9 kg (143 lb)   BMI 23.80 kg/m      Constitutional:           Skin:             Head:           Eyes:           Lymph:      ENT:           Neck:           Respiratory:            Cardiac:                                                           GI:           Extremities and Muscular Skeletal:                 Neurological:           Psych:         Recent Lab Results:  LIPID RESULTS:  Lab Results   Component Value Date    TRIG 60 12/17/2021       LIVER ENZYME RESULTS:  No results found for: AST, ALT    CBC RESULTS:  No results found for: WBC, RBC, HGB, HCT, MCV, MCH, MCHC, RDW, PLT    BMP RESULTS:  Lab Results   Component Value Date    CHLORIDE 98 12/17/2021        A1C RESULTS:  No results found for: A1C    INR RESULTS:  No results found for: INR        CC  Laureano Hanna MD  [address]  [place] [address]  "

## 2022-05-03 NOTE — LETTER
5/3/2022    Rian Chavez MD  [address]  [address]  [facility] [address]    RE: Shauna Stout       Dear Colleague,     I had the pleasure of seeing Shauna Stout in the [facility].  HPI and Plan:   See dictation    No orders of the defined types were placed in this encounter.    Orders Placed This Encounter   Medications     levothyroxine (SYNTHROID/LEVOTHROID) 88 MCG tablet     Sig: Take 88 mcg by mouth daily     cholecalciferol (VITAMIN D3) 1250 mcg (85468 units) capsule     Sig: Take 1,250 mcg by mouth every 7 days     UNABLE TO FIND     Sig: Take 5,000 mcg by mouth daily MEDICATION NAME: Methyl folate     niacin 500 MG tablet     Sig: Take by mouth daily (with breakfast)     Medications Discontinued During This Encounter   Medication Reason     fexofenadine (ALLEGRA ALLERGY) 180 MG tablet Stopped by Patient     levothyroxine (SYNTHROID/LEVOTHROID) 75 MCG tablet Dose adjustment     Cholecalciferol (VITAMIN D3 PO) Dose adjustment     folic acid (FOLVITE) 1 MG tablet Dose adjustment         Encounter Diagnoses   Name Primary?     POTS (postural orthostatic tachycardia syndrome)      Systolic murmur        CURRENT MEDICATIONS:  Current Outpatient Medications   Medication Sig Dispense Refill     acetaminophen (TYLENOL) 650 MG CR tablet Take 650 mg by mouth every 8 hours as needed for mild pain or fever       Ascorbic Acid (BRENTON-C PO) Take 200 mg by mouth daily       aspirin-acetaminophen-caffeine (EXCEDRIN MIGRAINE) 250-250-65 MG tablet Take 1 tablet by mouth every 6 hours as needed for headaches       atorvastatin (LIPITOR) 20 MG tablet TK 1 T PO QD       baclofen (LIORESAL) 10 MG tablet Take 10 mg by mouth 2 times daily       cevimeline (EVOXAC) 30 MG capsule Take 30 mg by mouth 2 times daily       cholecalciferol (VITAMIN D3) 1250 mcg (59420 units) capsule Take 1,250 mcg by mouth every 7 days       CYANOCOBALAMIN PO Take 250 mcg by mouth daily       Flaxseed, Linseed, (FLAXSEED  OIL PO) Take by mouth daily       fluticasone-salmeterol (ADVAIR) 500-50 MCG/DOSE inhaler Inhale 1 puff into the lungs every 12 hours       Glycerin-Polysorbate 80 (REFRESH DRY EYE THERAPY OP) Apply to eye daily as needed       KETOROLAC-BUPIV-KETAMINE IJ Inject as directed daily as needed       levalbuterol (XOPENEX HFA) 45 MCG/ACT inhaler Inhale 2 puffs into the lungs       levothyroxine (SYNTHROID/LEVOTHROID) 88 MCG tablet Take 88 mcg by mouth daily       liothyronine (CYTOMEL) 5 MCG tablet Take 5 mcg by mouth daily       Menaquinone-7 (VITAMIN K2 PO) Take by mouth once a week       niacin 500 MG tablet Take by mouth daily (with breakfast)       ondansetron (ZOFRAN) 8 MG tablet Take by mouth every 8 hours as needed for nausea       RIBOFLAVIN PO Take 100 mg by mouth daily       sucralfate (CARAFATE) 1 GM tablet Take 1 g by mouth 4 times daily as needed       sulfaSALAzine ER (AZULFIDINE EN) 500 MG EC tablet Take 500 mg by mouth 4 times daily Two tabs, twce daily       thiamine (B-1) 100 MG tablet Take 100 mg by mouth daily       ubrogepant (UBRELVY) 100 MG tablet Take 100 mg by mouth at onset of headache       UNABLE TO FIND Take 5,000 mcg by mouth daily MEDICATION NAME: Methyl folate         ALLERGIES     Allergies   Allergen Reactions     Azithromycin      Gets C-diff     Pneumococcal Vaccines Hives     Singulair [Montelukast] Headache     Amoxicillin Rash     Rinvoq [Upadacitinib] Rash       PAST MEDICAL HISTORY:  Past Medical History:   Diagnosis Date     Anxiety      Asthma      Duodenal ulcer without hemorrhage or perforation and without obstruction     due to nsaid     Familial hypercholesterolemia      Fibroid uterus      Hypothyroidism     on synthroid     MCTD (mixed connective tissue disease) (H)      Migraine      Raynaud's syndrome      Sjogren's disease (H)        PAST SURGICAL HISTORY:  Past Surgical History:   Procedure Laterality Date     eye tear duct plug       TONSILLECTOMY       TYMPANOPLASTY  "        FAMILY HISTORY:  Family History   Problem Relation Age of Onset     Hyperlipidemia Mother      Hypertension Mother      Thyroid Disease Mother      Tremor Mother      Chronic Obstructive Pulmonary Disease Father      Aneurysm Father        SOCIAL HISTORY:  Social History     Socioeconomic History     Marital status: Single     Spouse name: None     Number of children: None     Years of education: None     Highest education level: None   Occupational History     Comment: soft ware developement   Tobacco Use     Smoking status: Never Smoker     Smokeless tobacco: Never Used   Substance and Sexual Activity     Alcohol use: Yes     Comment: occassional       Review of Systems:  Skin:  Negative     Eyes:  Positive for glasses  ENT:  Negative    Respiratory:  Negative    Cardiovascular:    Positive for;lightheadedness;dizziness;syncope or near-syncope  Gastroenterology: Negative    Genitourinary:  Negative    Musculoskeletal:  Positive for back pain;arthritis  Neurologic:  Negative    Psychiatric:  Negative    Heme/Lymph/Imm:  Positive for allergies  Endocrine:  Positive for thyroid disorder    Physical Exam:  Vitals: Ht 1.651 m (5' 5\")   Wt 64.9 kg (143 lb)   BMI 23.80 kg/m      Constitutional:           Skin:             Head:           Eyes:           Lymph:      ENT:           Neck:           Respiratory:            Cardiac:                                                           GI:           Extremities and Muscular Skeletal:                 Neurological:           Psych:         Recent Lab Results:  LIPID RESULTS:  Lab Results   Component Value Date    TRIG 60 12/17/2021       LIVER ENZYME RESULTS:  No results found for: AST, ALT    CBC RESULTS:  No results found for: WBC, RBC, HGB, HCT, MCV, MCH, MCHC, RDW, PLT    BMP RESULTS:  Lab Results   Component Value Date    CHLORIDE 98 12/17/2021        A1C RESULTS:  No results found for: A1C    INR RESULTS:  No results found for: INR    CC  Laureano Hanna, " MD  [address]  [place] [address]      Thank you for allowing me to participate in the care of your patient.      Sincerely,     Laureano Hanna MD     [facility]

## 2022-07-12 ENCOUNTER — APPOINTMENT (OUTPATIENT)
Dept: URBAN - METROPOLITAN AREA CLINIC 260 | Age: 32
Setting detail: DERMATOLOGY
End: 2022-07-13

## 2022-07-12 DIAGNOSIS — Z71.89 OTHER SPECIFIED COUNSELING: ICD-10-CM

## 2022-07-12 DIAGNOSIS — L70.0 ACNE VULGARIS: ICD-10-CM

## 2022-07-12 DIAGNOSIS — L72.8 OTHER FOLLICULAR CYSTS OF THE SKIN AND SUBCUTANEOUS TISSUE: ICD-10-CM

## 2022-07-12 DIAGNOSIS — D22 MELANOCYTIC NEVI: ICD-10-CM

## 2022-07-12 PROBLEM — D22.71 MELANOCYTIC NEVI OF RIGHT LOWER LIMB, INCLUDING HIP: Status: ACTIVE | Noted: 2022-07-12

## 2022-07-12 PROCEDURE — OTHER EDUCATIONAL RESOURCES PROVIDED: OTHER

## 2022-07-12 PROCEDURE — 99203 OFFICE O/P NEW LOW 30 MIN: CPT

## 2022-07-12 PROCEDURE — OTHER MIPS QUALITY: OTHER

## 2022-07-12 PROCEDURE — OTHER ADDITIONAL NOTES: OTHER

## 2022-07-12 PROCEDURE — OTHER PHOTO-DOCUMENTATION: OTHER

## 2022-07-12 PROCEDURE — OTHER COUNSELING: OTHER

## 2022-07-12 ASSESSMENT — LOCATION SIMPLE DESCRIPTION DERM
LOCATION SIMPLE: RIGHT 4TH TOE
LOCATION SIMPLE: LEFT CHEEK
LOCATION SIMPLE: UPPER BACK
LOCATION SIMPLE: CHEST
LOCATION SIMPLE: ABDOMEN

## 2022-07-12 ASSESSMENT — LOCATION ZONE DERM
LOCATION ZONE: TRUNK
LOCATION ZONE: TOE
LOCATION ZONE: FACE

## 2022-07-12 ASSESSMENT — LOCATION DETAILED DESCRIPTION DERM
LOCATION DETAILED: RIGHT MEDIAL SUPERIOR CHEST
LOCATION DETAILED: SUPERIOR THORACIC SPINE
LOCATION DETAILED: RIGHT MEDIAL 4TH TOE
LOCATION DETAILED: LEFT MEDIAL MALAR CHEEK
LOCATION DETAILED: LEFT LATERAL ABDOMEN

## 2022-07-12 ASSESSMENT — SEVERITY ASSESSMENT OVERALL AMONG ALL PATIENTS
IN YOUR EXPERIENCE, AMONG ALL PATIENTS YOU HAVE SEEN WITH THIS CONDITION, HOW SEVERE IS THIS PATIENT'S CONDITION?: INFLAMMATORY LESIONS MORE APPARENT; MANY COMEDONES AND PAPULES/PUSTULES, +/- FEW NODULOCYSTIC LESIONS

## 2022-07-12 NOTE — PROCEDURE: ADDITIONAL NOTES
Additional Notes: Patient encouraged to schedule focused visit.
Detail Level: Zone
Additional Notes: Patient encouraged to make 30 min appointment for excision.
Render Risk Assessment In Note?: no

## 2022-07-12 NOTE — PROCEDURE: PHOTO-DOCUMENTATION
Photo Preface (Leave Blank If You Do Not Want): Photographs were obtained of area
Details (Free Text): Right foot between third and fourth toe
Detail Level: Zone

## 2022-07-12 NOTE — PROCEDURE: COUNSELING
Sarecycline Counseling: Patient advised regarding possible photosensitivity and discoloration of the teeth, skin, lips, tongue and gums.  Patient instructed to avoid sunlight, if possible.  When exposed to sunlight, patients should wear protective clothing, sunglasses, and sunscreen.  The patient was instructed to call the office immediately if the following severe adverse effects occur:  hearing changes, easy bruising/bleeding, severe headache, or vision changes.  The patient verbalized understanding of the proper use and possible adverse effects of sarecycline.  All of the patient's questions and concerns were addressed.
Erythromycin Counseling:  I discussed with the patient the risks of erythromycin including but not limited to GI upset, allergic reaction, drug rash, diarrhea, increase in liver enzymes, and yeast infections.
Isotretinoin Counseling: Patient should get monthly blood tests, not donate blood, not drive at night if vision affected, not share medication, and not undergo elective surgery for 6 months after tx completed. Side effects reviewed, pt to contact office should one occur.
Topical Sulfur Applications Counseling: Topical Sulfur Counseling: Patient counseled that this medication may cause skin irritation or allergic reactions.  In the event of skin irritation, the patient was advised to reduce the amount of the drug applied or use it less frequently.   The patient verbalized understanding of the proper use and possible adverse effects of topical sulfur application.  All of the patient's questions and concerns were addressed.
Spironolactone Pregnancy And Lactation Text: This medication can cause feminization of the male fetus and should be avoided during pregnancy. The active metabolite is also found in breast milk.
Topical Sulfur Applications Pregnancy And Lactation Text: This medication is Pregnancy Category C and has an unknown safety profile during pregnancy. It is unknown if this topical medication is excreted in breast milk.
Aklief Pregnancy And Lactation Text: It is unknown if this medication is safe to use during pregnancy.  It is unknown if this medication is excreted in breast milk.  Breastfeeding women should use the topical cream on the smallest area of the skin for the shortest time needed while breastfeeding.  Do not apply to nipple and areola.
Tazorac Pregnancy And Lactation Text: This medication is not safe during pregnancy. It is unknown if this medication is excreted in breast milk.
Detail Level: Zone
Tetracycline Pregnancy And Lactation Text: This medication is Pregnancy Category D and not consider safe during pregnancy. It is also excreted in breast milk.
Erythromycin Pregnancy And Lactation Text: This medication is Pregnancy Category B and is considered safe during pregnancy. It is also excreted in breast milk.
Tetracycline Counseling: Patient counseled regarding possible photosensitivity and increased risk for sunburn.  Patient instructed to avoid sunlight, if possible.  When exposed to sunlight, patients should wear protective clothing, sunglasses, and sunscreen.  The patient was instructed to call the office immediately if the following severe adverse effects occur:  hearing changes, easy bruising/bleeding, severe headache, or vision changes.  The patient verbalized understanding of the proper use and possible adverse effects of tetracycline.  All of the patient's questions and concerns were addressed. Patient understands to avoid pregnancy while on therapy due to potential birth defects.
Isotretinoin Pregnancy And Lactation Text: This medication is Pregnancy Category X and is considered extremely dangerous during pregnancy. It is unknown if it is excreted in breast milk.
Birth Control Pills Counseling: Birth Control Pill Counseling: I discussed with the patient the potential side effects of OCPs including but not limited to increased risk of stroke, heart attack, thrombophlebitis, deep venous thrombosis, hepatic adenomas, breast changes, GI upset, headaches, and depression.  The patient verbalized understanding of the proper use and possible adverse effects of OCPs. All of the patient's questions and concerns were addressed.
Use Enhanced Medication Counseling?: No
Dapsone Pregnancy And Lactation Text: This medication is Pregnancy Category C and is not considered safe during pregnancy or breast feeding.
Winlevi Pregnancy And Lactation Text: This medication is considered safe during pregnancy and breastfeeding.
Tazorac Counseling:  Patient advised that medication is irritating and drying.  Patient may need to apply sparingly and wash off after an hour before eventually leaving it on overnight.  The patient verbalized understanding of the proper use and possible adverse effects of tazorac.  All of the patient's questions and concerns were addressed.
Aklief counseling:  Patient advised to apply a pea-sized amount only at bedtime and wait 30 minutes after washing their face before applying.  If too drying, patient may add a non-comedogenic moisturizer.  The most commonly reported side effects including irritation, redness, scaling, dryness, stinging, burning, itching, and increased risk of sunburn.  The patient verbalized understanding of the proper use and possible adverse effects of retinoids.  All of the patient's questions and concerns were addressed.
Bactrim Pregnancy And Lactation Text: This medication is Pregnancy Category D and is known to cause fetal risk.  It is also excreted in breast milk.
Benzoyl Peroxide Pregnancy And Lactation Text: This medication is Pregnancy Category C. It is unknown if benzoyl peroxide is excreted in breast milk.
Topical Clindamycin Pregnancy And Lactation Text: This medication is Pregnancy Category B and is considered safe during pregnancy. It is unknown if it is excreted in breast milk.
Winlevi Counseling:  I discussed with the patient the risks of topical clascoterone including but not limited to erythema, scaling, itching, and stinging. Patient voiced their understanding.
Doxycycline Counseling:  Patient counseled regarding possible photosensitivity and increased risk for sunburn.  Patient instructed to avoid sunlight, if possible.  When exposed to sunlight, patients should wear protective clothing, sunglasses, and sunscreen.  The patient was instructed to call the office immediately if the following severe adverse effects occur:  hearing changes, easy bruising/bleeding, severe headache, or vision changes.  The patient verbalized understanding of the proper use and possible adverse effects of doxycycline.  All of the patient's questions and concerns were addressed.
Topical Retinoid Pregnancy And Lactation Text: This medication is Pregnancy Category C. It is unknown if this medication is excreted in breast milk.
Azelaic Acid Pregnancy And Lactation Text: This medication is considered safe during pregnancy and breast feeding.
Bactrim Counseling:  I discussed with the patient the risks of sulfa antibiotics including but not limited to GI upset, allergic reaction, drug rash, diarrhea, dizziness, photosensitivity, and yeast infections.  Rarely, more serious reactions can occur including but not limited to aplastic anemia, agranulocytosis, methemoglobinemia, blood dyscrasias, liver or kidney failure, lung infiltrates or desquamative/blistering drug rashes.
High Dose Vitamin A Counseling: Side effects reviewed, pt to contact office should one occur.
High Dose Vitamin A Pregnancy And Lactation Text: High dose vitamin A therapy is contraindicated during pregnancy and breast feeding.
Azelaic Acid Counseling: Patient counseled that medicine may cause skin irritation and to avoid applying near the eyes.  In the event of skin irritation, the patient was advised to reduce the amount of the drug applied or use it less frequently.   The patient verbalized understanding of the proper use and possible adverse effects of azelaic acid.  All of the patient's questions and concerns were addressed.
Topical Clindamycin Counseling: Patient counseled that this medication may cause skin irritation or allergic reactions.  In the event of skin irritation, the patient was advised to reduce the amount of the drug applied or use it less frequently.   The patient verbalized understanding of the proper use and possible adverse effects of clindamycin.  All of the patient's questions and concerns were addressed.
Detail Level: Simple
Dapsone Counseling: I discussed with the patient the risks of dapsone including but not limited to hemolytic anemia, agranulocytosis, rashes, methemoglobinemia, kidney failure, peripheral neuropathy, headaches, GI upset, and liver toxicity.  Patients who start dapsone require monitoring including baseline LFTs and weekly CBCs for the first month, then every month thereafter.  The patient verbalized understanding of the proper use and possible adverse effects of dapsone.  All of the patient's questions and concerns were addressed.
Detail Level: Generalized
Doxycycline Pregnancy And Lactation Text: This medication is Pregnancy Category D and not consider safe during pregnancy. It is also excreted in breast milk but is considered safe for shorter treatment courses.
Patient Specific Counseling (Will Not Stick From Patient To Patient): Patient is encouraged to schedule focused visit to discuss acne
Benzoyl Peroxide Counseling: Patient counseled that medicine may cause skin irritation and bleach clothing.  In the event of skin irritation, the patient was advised to reduce the amount of the drug applied or use it less frequently.   The patient verbalized understanding of the proper use and possible adverse effects of benzoyl peroxide.  All of the patient's questions and concerns were addressed.
Birth Control Pills Pregnancy And Lactation Text: This medication should be avoided if pregnant and for the first 30 days post-partum.
Spironolactone Counseling: Patient advised regarding risks of diarrhea, abdominal pain, hyperkalemia, birth defects (for female patients), liver toxicity and renal toxicity. The patient may need blood work to monitor liver and kidney function and potassium levels while on therapy. The patient verbalized understanding of the proper use and possible adverse effects of spironolactone.  All of the patient's questions and concerns were addressed.
Azithromycin Counseling:  I discussed with the patient the risks of azithromycin including but not limited to GI upset, allergic reaction, drug rash, diarrhea, and yeast infections.
Topical Retinoid counseling:  Patient advised to apply a pea-sized amount only at bedtime and wait 30 minutes after washing their face before applying.  If too drying, patient may add a non-comedogenic moisturizer. The patient verbalized understanding of the proper use and possible adverse effects of retinoids.  All of the patient's questions and concerns were addressed.
Azithromycin Pregnancy And Lactation Text: This medication is considered safe during pregnancy and is also secreted in breast milk.
Minocycline Counseling: Patient advised regarding possible photosensitivity and discoloration of the teeth, skin, lips, tongue and gums.  Patient instructed to avoid sunlight, if possible.  When exposed to sunlight, patients should wear protective clothing, sunglasses, and sunscreen.  The patient was instructed to call the office immediately if the following severe adverse effects occur:  hearing changes, easy bruising/bleeding, severe headache, or vision changes.  The patient verbalized understanding of the proper use and possible adverse effects of minocycline.  All of the patient's questions and concerns were addressed.

## 2022-09-07 ENCOUNTER — APPOINTMENT (OUTPATIENT)
Dept: URBAN - METROPOLITAN AREA CLINIC 260 | Age: 32
Setting detail: DERMATOLOGY
End: 2022-09-08

## 2022-09-07 DIAGNOSIS — L70.0 ACNE VULGARIS: ICD-10-CM

## 2022-09-07 PROCEDURE — OTHER PRESCRIPTION: OTHER

## 2022-09-07 PROCEDURE — OTHER EDUCATIONAL RESOURCES PROVIDED: OTHER

## 2022-09-07 PROCEDURE — OTHER PRESCRIPTION MEDICATION MANAGEMENT: OTHER

## 2022-09-07 PROCEDURE — OTHER MIPS QUALITY: OTHER

## 2022-09-07 PROCEDURE — OTHER COUNSELING: OTHER

## 2022-09-07 PROCEDURE — 99214 OFFICE O/P EST MOD 30 MIN: CPT

## 2022-09-07 RX ORDER — CLINDAMYCIN PHOSPHATE 10 MG/ML
SOLUTION TOPICAL
Qty: 60 | Refills: 5 | Status: ERX | COMMUNITY
Start: 2022-09-07

## 2022-09-07 RX ORDER — TRETIONIN 0.25 MG/G
CREAM TOPICAL
Qty: 45 | Refills: 0 | Status: ERX | COMMUNITY
Start: 2022-09-07

## 2022-09-07 ASSESSMENT — LOCATION SIMPLE DESCRIPTION DERM
LOCATION SIMPLE: UPPER BACK
LOCATION SIMPLE: CHEST
LOCATION SIMPLE: LEFT CHEEK

## 2022-09-07 ASSESSMENT — LOCATION ZONE DERM
LOCATION ZONE: FACE
LOCATION ZONE: TRUNK

## 2022-09-07 ASSESSMENT — LOCATION DETAILED DESCRIPTION DERM
LOCATION DETAILED: SUPERIOR THORACIC SPINE
LOCATION DETAILED: MIDDLE STERNUM
LOCATION DETAILED: LEFT INFERIOR CENTRAL MALAR CHEEK

## 2022-09-07 ASSESSMENT — SEVERITY ASSESSMENT OVERALL AMONG ALL PATIENTS
IN YOUR EXPERIENCE, AMONG ALL PATIENTS YOU HAVE SEEN WITH THIS CONDITION, HOW SEVERE IS THIS PATIENT'S CONDITION?: MULTIPLE INFLAMMATORY LESIONS BUT NONINFLAMMATORY LESIONS PREDOMINATE

## 2022-09-07 NOTE — PROCEDURE: COUNSELING
Winlevi Pregnancy And Lactation Text: This medication is considered safe during pregnancy and breastfeeding.
Erythromycin Pregnancy And Lactation Text: This medication is Pregnancy Category B and is considered safe during pregnancy. It is also excreted in breast milk.
Birth Control Pills Counseling: Birth Control Pill Counseling: I discussed with the patient the potential side effects of OCPs including but not limited to increased risk of stroke, heart attack, thrombophlebitis, deep venous thrombosis, hepatic adenomas, breast changes, GI upset, headaches, and depression.  The patient verbalized understanding of the proper use and possible adverse effects of OCPs. All of the patient's questions and concerns were addressed.
Detail Level: Zone
Sarecycline Pregnancy And Lactation Text: This medication is Pregnancy Category D and not consider safe during pregnancy. It is also excreted in breast milk.
Benzoyl Peroxide Counseling: Patient counseled that medicine may cause skin irritation and bleach clothing.  In the event of skin irritation, the patient was advised to reduce the amount of the drug applied or use it less frequently.   The patient verbalized understanding of the proper use and possible adverse effects of benzoyl peroxide.  All of the patient's questions and concerns were addressed.
Erythromycin Counseling:  I discussed with the patient the risks of erythromycin including but not limited to GI upset, allergic reaction, drug rash, diarrhea, increase in liver enzymes, and yeast infections.
Topical Clindamycin Pregnancy And Lactation Text: This medication is Pregnancy Category B and is considered safe during pregnancy. It is unknown if it is excreted in breast milk.
Doxycycline Counseling:  Patient counseled regarding possible photosensitivity and increased risk for sunburn.  Patient instructed to avoid sunlight, if possible.  When exposed to sunlight, patients should wear protective clothing, sunglasses, and sunscreen.  The patient was instructed to call the office immediately if the following severe adverse effects occur:  hearing changes, easy bruising/bleeding, severe headache, or vision changes.  The patient verbalized understanding of the proper use and possible adverse effects of doxycycline.  All of the patient's questions and concerns were addressed.
Isotretinoin Pregnancy And Lactation Text: This medication is Pregnancy Category X and is considered extremely dangerous during pregnancy. It is unknown if it is excreted in breast milk.
Azelaic Acid Pregnancy And Lactation Text: This medication is considered safe during pregnancy and breast feeding.
Benzoyl Peroxide Pregnancy And Lactation Text: This medication is Pregnancy Category C. It is unknown if benzoyl peroxide is excreted in breast milk.
Azelaic Acid Counseling: Patient counseled that medicine may cause skin irritation and to avoid applying near the eyes.  In the event of skin irritation, the patient was advised to reduce the amount of the drug applied or use it less frequently.   The patient verbalized understanding of the proper use and possible adverse effects of azelaic acid.  All of the patient's questions and concerns were addressed.
Tetracycline Counseling: Patient counseled regarding possible photosensitivity and increased risk for sunburn.  Patient instructed to avoid sunlight, if possible.  When exposed to sunlight, patients should wear protective clothing, sunglasses, and sunscreen.  The patient was instructed to call the office immediately if the following severe adverse effects occur:  hearing changes, easy bruising/bleeding, severe headache, or vision changes.  The patient verbalized understanding of the proper use and possible adverse effects of tetracycline.  All of the patient's questions and concerns were addressed. Patient understands to avoid pregnancy while on therapy due to potential birth defects.
Aklief counseling:  Patient advised to apply a pea-sized amount only at bedtime and wait 30 minutes after washing their face before applying.  If too drying, patient may add a non-comedogenic moisturizer.  The most commonly reported side effects including irritation, redness, scaling, dryness, stinging, burning, itching, and increased risk of sunburn.  The patient verbalized understanding of the proper use and possible adverse effects of retinoids.  All of the patient's questions and concerns were addressed.
Spironolactone Pregnancy And Lactation Text: This medication can cause feminization of the male fetus and should be avoided during pregnancy. The active metabolite is also found in breast milk.
Topical Retinoid Pregnancy And Lactation Text: This medication is Pregnancy Category C. It is unknown if this medication is excreted in breast milk.
Azithromycin Counseling:  I discussed with the patient the risks of azithromycin including but not limited to GI upset, allergic reaction, drug rash, diarrhea, and yeast infections.
Bactrim Pregnancy And Lactation Text: This medication is Pregnancy Category D and is known to cause fetal risk.  It is also excreted in breast milk.
Bactrim Counseling:  I discussed with the patient the risks of sulfa antibiotics including but not limited to GI upset, allergic reaction, drug rash, diarrhea, dizziness, photosensitivity, and yeast infections.  Rarely, more serious reactions can occur including but not limited to aplastic anemia, agranulocytosis, methemoglobinemia, blood dyscrasias, liver or kidney failure, lung infiltrates or desquamative/blistering drug rashes.
Topical Clindamycin Counseling: Patient counseled that this medication may cause skin irritation or allergic reactions.  In the event of skin irritation, the patient was advised to reduce the amount of the drug applied or use it less frequently.   The patient verbalized understanding of the proper use and possible adverse effects of clindamycin.  All of the patient's questions and concerns were addressed.
Aklief Pregnancy And Lactation Text: It is unknown if this medication is safe to use during pregnancy.  It is unknown if this medication is excreted in breast milk.  Breastfeeding women should use the topical cream on the smallest area of the skin for the shortest time needed while breastfeeding.  Do not apply to nipple and areola.
Tazorac Pregnancy And Lactation Text: This medication is not safe during pregnancy. It is unknown if this medication is excreted in breast milk.
Tazorac Counseling:  Patient advised that medication is irritating and drying.  Patient may need to apply sparingly and wash off after an hour before eventually leaving it on overnight.  The patient verbalized understanding of the proper use and possible adverse effects of tazorac.  All of the patient's questions and concerns were addressed.
Birth Control Pills Pregnancy And Lactation Text: This medication should be avoided if pregnant and for the first 30 days post-partum.
Topical Sulfur Applications Counseling: Topical Sulfur Counseling: Patient counseled that this medication may cause skin irritation or allergic reactions.  In the event of skin irritation, the patient was advised to reduce the amount of the drug applied or use it less frequently.   The patient verbalized understanding of the proper use and possible adverse effects of topical sulfur application.  All of the patient's questions and concerns were addressed.
Include Pregnancy/Lactation Warning?: No
Topical Sulfur Applications Pregnancy And Lactation Text: This medication is Pregnancy Category C and has an unknown safety profile during pregnancy. It is unknown if this topical medication is excreted in breast milk.
Dapsone Pregnancy And Lactation Text: This medication is Pregnancy Category C and is not considered safe during pregnancy or breast feeding.
Topical Retinoid counseling:  Patient advised to apply a pea-sized amount only at bedtime and wait 30 minutes after washing their face before applying.  If too drying, patient may add a non-comedogenic moisturizer. The patient verbalized understanding of the proper use and possible adverse effects of retinoids.  All of the patient's questions and concerns were addressed.
High Dose Vitamin A Pregnancy And Lactation Text: High dose vitamin A therapy is contraindicated during pregnancy and breast feeding.
Doxycycline Pregnancy And Lactation Text: This medication is Pregnancy Category D and not consider safe during pregnancy. It is also excreted in breast milk but is considered safe for shorter treatment courses.
Winlevi Counseling:  I discussed with the patient the risks of topical clascoterone including but not limited to erythema, scaling, itching, and stinging. Patient voiced their understanding.
Spironolactone Counseling: Patient advised regarding risks of diarrhea, abdominal pain, hyperkalemia, birth defects (for female patients), liver toxicity and renal toxicity. The patient may need blood work to monitor liver and kidney function and potassium levels while on therapy. The patient verbalized understanding of the proper use and possible adverse effects of spironolactone.  All of the patient's questions and concerns were addressed.
Isotretinoin Counseling: Patient should get monthly blood tests, not donate blood, not drive at night if vision affected, not share medication, and not undergo elective surgery for 6 months after tx completed. Side effects reviewed, pt to contact office should one occur.
Azithromycin Pregnancy And Lactation Text: This medication is considered safe during pregnancy and is also secreted in breast milk.
High Dose Vitamin A Counseling: Side effects reviewed, pt to contact office should one occur.
Dapsone Counseling: I discussed with the patient the risks of dapsone including but not limited to hemolytic anemia, agranulocytosis, rashes, methemoglobinemia, kidney failure, peripheral neuropathy, headaches, GI upset, and liver toxicity.  Patients who start dapsone require monitoring including baseline LFTs and weekly CBCs for the first month, then every month thereafter.  The patient verbalized understanding of the proper use and possible adverse effects of dapsone.  All of the patient's questions and concerns were addressed.
Minocycline Counseling: Patient advised regarding possible photosensitivity and discoloration of the teeth, skin, lips, tongue and gums.  Patient instructed to avoid sunlight, if possible.  When exposed to sunlight, patients should wear protective clothing, sunglasses, and sunscreen.  The patient was instructed to call the office immediately if the following severe adverse effects occur:  hearing changes, easy bruising/bleeding, severe headache, or vision changes.  The patient verbalized understanding of the proper use and possible adverse effects of minocycline.  All of the patient's questions and concerns were addressed.
Sarecycline Counseling: Patient advised regarding possible photosensitivity and discoloration of the teeth, skin, lips, tongue and gums.  Patient instructed to avoid sunlight, if possible.  When exposed to sunlight, patients should wear protective clothing, sunglasses, and sunscreen.  The patient was instructed to call the office immediately if the following severe adverse effects occur:  hearing changes, easy bruising/bleeding, severe headache, or vision changes.  The patient verbalized understanding of the proper use and possible adverse effects of sarecycline.  All of the patient's questions and concerns were addressed.

## 2022-09-07 NOTE — PROCEDURE: PRESCRIPTION MEDICATION MANAGEMENT
Continue Regimen: Current gentle products
Render In Strict Bullet Format?: No
Detail Level: Simple
Discontinue Regimen: Stridex wipes
Plan: Return in 2 months for recheck
Initiate Treatment: Clindamycin 1% swab QAM + Tretinoin 0.025% cream QHS
Modify Regimen: Discussed Spironolactone; there appears to be an interaction with her Ubrelvy. Instructed patient to discuss this with her prescribing physician to give the OK. We would prescribe 25 mg BID if approved.

## 2022-09-28 ENCOUNTER — APPOINTMENT (OUTPATIENT)
Dept: URBAN - METROPOLITAN AREA CLINIC 260 | Age: 32
Setting detail: DERMATOLOGY
End: 2022-09-29

## 2022-09-28 VITALS — HEIGHT: 65 IN | WEIGHT: 140 LBS

## 2022-09-28 DIAGNOSIS — D49.2 NEOPLASM OF UNSPECIFIED BEHAVIOR OF BONE, SOFT TISSUE, AND SKIN: ICD-10-CM

## 2022-09-28 PROCEDURE — 11402 EXC TR-EXT B9+MARG 1.1-2 CM: CPT

## 2022-09-28 PROCEDURE — OTHER PHOTO-DOCUMENTATION: OTHER

## 2022-09-28 PROCEDURE — OTHER EDUCATIONAL RESOURCES PROVIDED: OTHER

## 2022-09-28 PROCEDURE — OTHER PUNCH EXCISION: OTHER

## 2022-09-28 ASSESSMENT — LOCATION ZONE DERM: LOCATION ZONE: TRUNK

## 2022-09-28 ASSESSMENT — LOCATION SIMPLE DESCRIPTION DERM: LOCATION SIMPLE: GROIN

## 2022-09-28 ASSESSMENT — LOCATION DETAILED DESCRIPTION DERM: LOCATION DETAILED: LEFT SUPRAPUBIC SKIN

## 2022-09-28 NOTE — PROCEDURE: PHOTO-DOCUMENTATION
Photo Preface (Leave Blank If You Do Not Want): Photographs were obtained of affected skin today
Details (Free Text): To monitor excision site
Detail Level: Zone

## 2022-09-28 NOTE — PROCEDURE: PUNCH EXCISION
Medical Necessity Clause: This procedure was medically necessary because the lesion that was treated was:
Punch Size In Mm: 5
Size Of Lesion (*Required): 1.5
Nostril Rim Text: The closure involved the nostril rim.
Debridement Text: The wound edges were debrided prior to proceeding with the closure to facilitate wound healing.
3.5 Mm Punch Excision Text: A 3.5 mm punch biopsy was used to excise the lesion to the level of the subcutaneous fat.  Blunt dissection was used to free the lesion from the surrounding tissues and the lesion was removed.
X Depth Of Punch In Cm (Optional): 0
1.5 Mm Punch Excision Text: A 1.5 mm punch biopsy was used to excise the lesion to the level of the subcutaneous fat.  Blunt dissection was used to free the lesion from the surrounding tissues and the lesion was removed.
Render Post-Care Instructions In Note?: no
Referring Physician (Optional): ONA
4.5 Mm Punch Excision Text: A 4.5 mm punch biopsy was used to excise the lesion to the level of the subcutaneous fat.  Blunt dissection was used to free the lesion from the surrounding tissues and the lesion was removed.
Anesthesia Volume In Cc: 2.3
Post-Care Instructions: I reviewed with the patient in detail post-care instructions. Patient is to keep the biopsy site dry overnight, and then apply bacitracin twice daily until healed. Patient may apply hydrogen peroxide soaks to remove any crusting.
3 Mm Punch Excision Text: A 3 mm punch biopsy was used to excise the lesion to the level of the subcutaneous fat.  Blunt dissection was used to free the lesion from the surrounding tissues and the lesion was removed.
6 Mm Punch Excision Text: A 6 mm punch biopsy was used to excise the lesion to the level of the subcutaneous fat.  Blunt dissection was used to free the lesion from the surrounding tissues and the lesion was removed.
Epidermal Closure: simple interrupted
12 Mm Punch Excision Text: A 12 mm punch biopsy was used to excise the lesion to the level of the subcutaneous fat.  Blunt dissection was used to free the lesion from the surrounding tissues and the lesion was removed.
Detail Level: Detailed
Consent was obtained from the patient. The risks and benefits to therapy were discussed in detail. Specifically, the risks of infection, scarring, bleeding, prolonged wound healing, incomplete removal, allergy to anesthesia, nerve injury and recurrence were addressed. Prior to the procedure, the treatment site was clearly identified and confirmed by the patient. All components of Universal Protocol/PAUSE Rule completed.
Wound Dressings: a pressure dressing
Anesthesia Type: 1% lidocaine without epinephrine
7 Mm Punch Excision Text: A 7 mm punch biopsy was used to excise the lesion to the level of the subcutaneous fat.  Blunt dissection was used to free the lesion from the surrounding tissues and the lesion was removed.
Include Undermining Statement In The Repair Note?: Yes
2.5 Mm Punch Excision Text: A 2.5 mm punch biopsy was used to excise the lesion to the level of the subcutaneous fat.  Blunt dissection was used to free the lesion from the surrounding tissues and the lesion was removed.
Retention Suture Text: Retention sutures were placed to support the closure and prevent dehiscence.
Helical Rim Text: The closure involved the helical rim.
5 Mm Punch Excision Text: A 5 mm punch biopsy was used to excise the lesion to the level of the subcutaneous fat.  Blunt dissection was used to free the lesion from the surrounding tissues and the lesion was removed.
10 Mm Punch Excision Text: A 10 mm punch biopsy was used to excise the lesion to the level of the subcutaneous fat.  Blunt dissection was used to free the lesion from the surrounding tissues and the lesion was removed.
Wound Care: Petrolatum
Undermining Type: Entire Wound
4 Mm Punch Excision Text: A 4 mm punch biopsy was used to excise the lesion to the level of the subcutaneous fat.  Blunt dissection was used to free the lesion from the surrounding tissues and the lesion was removed.
Vermilion Border Text: The closure involved the vermilion border.
Repair Type: None (Simple)
8 Mm Punch Excision Text: A 8 mm punch biopsy was used to excise the lesion to the level of the subcutaneous fat.  Blunt dissection was used to free the lesion from the surrounding tissues and the lesion was removed.
Billing Type: Third-Party Bill
Epidermal Sutures: 4-0 Prolene
Hemostasis: None
Path Notes (To The Dermatopathologist): Please check margins
Suture Removal: 10 days
Notification Instructions: Patient will be notified of biopsy results. However, patient instructed to call the office if not contacted within 2 weeks.
2 Mm Punch Excision Text: A 2 mm punch biopsy was used to excise the lesion to the level of the subcutaneous fat.  Blunt dissection was used to free the lesion from the surrounding tissues and the lesion was removed.

## 2022-10-10 ENCOUNTER — APPOINTMENT (OUTPATIENT)
Dept: URBAN - METROPOLITAN AREA CLINIC 260 | Age: 32
Setting detail: DERMATOLOGY
End: 2022-10-10

## 2022-10-10 DIAGNOSIS — Z48.02 ENCOUNTER FOR REMOVAL OF SUTURES: ICD-10-CM

## 2022-10-10 PROCEDURE — OTHER PHOTO-DOCUMENTATION: OTHER

## 2022-10-10 PROCEDURE — OTHER MIPS QUALITY: OTHER

## 2022-10-10 PROCEDURE — OTHER COUNSELING: OTHER

## 2022-10-10 PROCEDURE — OTHER SUTURE REMOVAL (GLOBAL PERIOD): OTHER

## 2022-10-10 ASSESSMENT — LOCATION DETAILED DESCRIPTION DERM: LOCATION DETAILED: LEFT LATERAL ABDOMEN

## 2022-10-10 ASSESSMENT — LOCATION ZONE DERM: LOCATION ZONE: TRUNK

## 2022-10-10 ASSESSMENT — LOCATION SIMPLE DESCRIPTION DERM: LOCATION SIMPLE: ABDOMEN

## 2022-10-10 NOTE — PROCEDURE: MIPS QUALITY
Detail Level: Detailed
Patient returned the call              IUZIS:[phone]
Quality 130: Documentation Of Current Medications In The Medical Record: Current Medications Documented
Quality 431: Preventive Care And Screening: Unhealthy Alcohol Use - Screening: Patient not identified as an unhealthy alcohol user when screened for unhealthy alcohol use using a systematic screening method
Quality 226: Preventive Care And Screening: Tobacco Use: Screening And Cessation Intervention: Patient screened for tobacco use and is an ex/non-smoker

## 2022-10-10 NOTE — PROCEDURE: SUTURE REMOVAL (GLOBAL PERIOD)
Add 52662 Cpt? (Important Note: In 2017 The Use Of 99238 Is Being Tracked By Cms To Determine Future Global Period Reimbursement For Global Periods): no
Detail Level: Detailed

## 2022-10-15 ENCOUNTER — HEALTH MAINTENANCE LETTER (OUTPATIENT)
Age: 32
End: 2022-10-15

## 2022-11-21 ENCOUNTER — APPOINTMENT (OUTPATIENT)
Dept: URBAN - METROPOLITAN AREA CLINIC 260 | Age: 32
Setting detail: DERMATOLOGY
End: 2022-11-22

## 2022-11-21 VITALS — HEIGHT: 65 IN | WEIGHT: 142 LBS

## 2022-11-21 DIAGNOSIS — L70.0 ACNE VULGARIS: ICD-10-CM

## 2022-11-21 PROCEDURE — OTHER PRESCRIPTION: OTHER

## 2022-11-21 PROCEDURE — OTHER COUNSELING: OTHER

## 2022-11-21 PROCEDURE — 99214 OFFICE O/P EST MOD 30 MIN: CPT

## 2022-11-21 PROCEDURE — OTHER PRESCRIPTION MEDICATION MANAGEMENT: OTHER

## 2022-11-21 PROCEDURE — OTHER MIPS QUALITY: OTHER

## 2022-11-21 RX ORDER — SPIRONOLACTONE 25 MG/1
TABLET, FILM COATED ORAL QD
Qty: 30 | Refills: 1 | Status: ERX | COMMUNITY
Start: 2022-11-21

## 2022-11-21 RX ORDER — TRETIONIN 0.25 MG/G
CREAM TOPICAL
Qty: 45 | Refills: 3 | Status: ERX

## 2022-11-21 ASSESSMENT — LOCATION SIMPLE DESCRIPTION DERM
LOCATION SIMPLE: CHEST
LOCATION SIMPLE: UPPER BACK
LOCATION SIMPLE: LEFT CHEEK

## 2022-11-21 ASSESSMENT — LOCATION ZONE DERM
LOCATION ZONE: FACE
LOCATION ZONE: TRUNK

## 2022-11-21 NOTE — PROCEDURE: PRESCRIPTION MEDICATION MANAGEMENT
Plan: Discussed possible interaction with Spironolactone and Ubrevly. Starting Spironolactone at this visit was okayed by PCP.
Initiate Treatment: Spironolactone 25mg qd increasing to bid as tolerated.
Continue Regimen: Clindamycin 1% swab QAM + Tretinoin 0.025% cream QHS
Detail Level: Simple
Render In Strict Bullet Format?: No

## 2022-11-21 NOTE — PROCEDURE: COUNSELING
Bactrim Pregnancy And Lactation Text: This medication is Pregnancy Category D and is known to cause fetal risk.  It is also excreted in breast milk.
Dapsone Counseling: I discussed with the patient the risks of dapsone including but not limited to hemolytic anemia, agranulocytosis, rashes, methemoglobinemia, kidney failure, peripheral neuropathy, headaches, GI upset, and liver toxicity.  Patients who start dapsone require monitoring including baseline LFTs and weekly CBCs for the first month, then every month thereafter.  The patient verbalized understanding of the proper use and possible adverse effects of dapsone.  All of the patient's questions and concerns were addressed.
Tetracycline Pregnancy And Lactation Text: This medication is Pregnancy Category D and not consider safe during pregnancy. It is also excreted in breast milk.
Bactrim Counseling:  I discussed with the patient the risks of sulfa antibiotics including but not limited to GI upset, allergic reaction, drug rash, diarrhea, dizziness, photosensitivity, and yeast infections.  Rarely, more serious reactions can occur including but not limited to aplastic anemia, agranulocytosis, methemoglobinemia, blood dyscrasias, liver or kidney failure, lung infiltrates or desquamative/blistering drug rashes.
Topical Retinoid Pregnancy And Lactation Text: This medication is Pregnancy Category C. It is unknown if this medication is excreted in breast milk.
Azelaic Acid Counseling: Patient counseled that medicine may cause skin irritation and to avoid applying near the eyes.  In the event of skin irritation, the patient was advised to reduce the amount of the drug applied or use it less frequently.   The patient verbalized understanding of the proper use and possible adverse effects of azelaic acid.  All of the patient's questions and concerns were addressed.
Topical Sulfur Applications Pregnancy And Lactation Text: This medication is Pregnancy Category C and has an unknown safety profile during pregnancy. It is unknown if this topical medication is excreted in breast milk.
Winlevi Pregnancy And Lactation Text: This medication is considered safe during pregnancy and breastfeeding.
Azithromycin Counseling:  I discussed with the patient the risks of azithromycin including but not limited to GI upset, allergic reaction, drug rash, diarrhea, and yeast infections.
Birth Control Pills Pregnancy And Lactation Text: This medication should be avoided if pregnant and for the first 30 days post-partum.
Erythromycin Pregnancy And Lactation Text: This medication is Pregnancy Category B and is considered safe during pregnancy. It is also excreted in breast milk.
Benzoyl Peroxide Pregnancy And Lactation Text: This medication is Pregnancy Category C. It is unknown if benzoyl peroxide is excreted in breast milk.
Dapsone Pregnancy And Lactation Text: This medication is Pregnancy Category C and is not considered safe during pregnancy or breast feeding.
Aklief Pregnancy And Lactation Text: It is unknown if this medication is safe to use during pregnancy.  It is unknown if this medication is excreted in breast milk.  Breastfeeding women should use the topical cream on the smallest area of the skin for the shortest time needed while breastfeeding.  Do not apply to nipple and areola.
Isotretinoin Counseling: Patient should get monthly blood tests, not donate blood, not drive at night if vision affected, not share medication, and not undergo elective surgery for 6 months after tx completed. Side effects reviewed, pt to contact office should one occur.
Use Enhanced Medication Counseling?: No
Isotretinoin Pregnancy And Lactation Text: This medication is Pregnancy Category X and is considered extremely dangerous during pregnancy. It is unknown if it is excreted in breast milk.
Topical Retinoid counseling:  Patient advised to apply a pea-sized amount only at bedtime and wait 30 minutes after washing their face before applying.  If too drying, patient may add a non-comedogenic moisturizer. The patient verbalized understanding of the proper use and possible adverse effects of retinoids.  All of the patient's questions and concerns were addressed.
Winlevi Counseling:  I discussed with the patient the risks of topical clascoterone including but not limited to erythema, scaling, itching, and stinging. Patient voiced their understanding.
Tazorac Pregnancy And Lactation Text: This medication is not safe during pregnancy. It is unknown if this medication is excreted in breast milk.
Benzoyl Peroxide Counseling: Patient counseled that medicine may cause skin irritation and bleach clothing.  In the event of skin irritation, the patient was advised to reduce the amount of the drug applied or use it less frequently.   The patient verbalized understanding of the proper use and possible adverse effects of benzoyl peroxide.  All of the patient's questions and concerns were addressed.
Doxycycline Counseling:  Patient counseled regarding possible photosensitivity and increased risk for sunburn.  Patient instructed to avoid sunlight, if possible.  When exposed to sunlight, patients should wear protective clothing, sunglasses, and sunscreen.  The patient was instructed to call the office immediately if the following severe adverse effects occur:  hearing changes, easy bruising/bleeding, severe headache, or vision changes.  The patient verbalized understanding of the proper use and possible adverse effects of doxycycline.  All of the patient's questions and concerns were addressed.
Azelaic Acid Pregnancy And Lactation Text: This medication is considered safe during pregnancy and breast feeding.
High Dose Vitamin A Pregnancy And Lactation Text: High dose vitamin A therapy is contraindicated during pregnancy and breast feeding.
Tetracycline Counseling: Patient counseled regarding possible photosensitivity and increased risk for sunburn.  Patient instructed to avoid sunlight, if possible.  When exposed to sunlight, patients should wear protective clothing, sunglasses, and sunscreen.  The patient was instructed to call the office immediately if the following severe adverse effects occur:  hearing changes, easy bruising/bleeding, severe headache, or vision changes.  The patient verbalized understanding of the proper use and possible adverse effects of tetracycline.  All of the patient's questions and concerns were addressed. Patient understands to avoid pregnancy while on therapy due to potential birth defects.
Minocycline Counseling: Patient advised regarding possible photosensitivity and discoloration of the teeth, skin, lips, tongue and gums.  Patient instructed to avoid sunlight, if possible.  When exposed to sunlight, patients should wear protective clothing, sunglasses, and sunscreen.  The patient was instructed to call the office immediately if the following severe adverse effects occur:  hearing changes, easy bruising/bleeding, severe headache, or vision changes.  The patient verbalized understanding of the proper use and possible adverse effects of minocycline.  All of the patient's questions and concerns were addressed.
Tazorac Counseling:  Patient advised that medication is irritating and drying.  Patient may need to apply sparingly and wash off after an hour before eventually leaving it on overnight.  The patient verbalized understanding of the proper use and possible adverse effects of tazorac.  All of the patient's questions and concerns were addressed.
Topical Sulfur Applications Counseling: Topical Sulfur Counseling: Patient counseled that this medication may cause skin irritation or allergic reactions.  In the event of skin irritation, the patient was advised to reduce the amount of the drug applied or use it less frequently.   The patient verbalized understanding of the proper use and possible adverse effects of topical sulfur application.  All of the patient's questions and concerns were addressed.
Sarecycline Counseling: Patient advised regarding possible photosensitivity and discoloration of the teeth, skin, lips, tongue and gums.  Patient instructed to avoid sunlight, if possible.  When exposed to sunlight, patients should wear protective clothing, sunglasses, and sunscreen.  The patient was instructed to call the office immediately if the following severe adverse effects occur:  hearing changes, easy bruising/bleeding, severe headache, or vision changes.  The patient verbalized understanding of the proper use and possible adverse effects of sarecycline.  All of the patient's questions and concerns were addressed.
Doxycycline Pregnancy And Lactation Text: This medication is Pregnancy Category D and not consider safe during pregnancy. It is also excreted in breast milk but is considered safe for shorter treatment courses.
High Dose Vitamin A Counseling: Side effects reviewed, pt to contact office should one occur.
Topical Clindamycin Pregnancy And Lactation Text: This medication is Pregnancy Category B and is considered safe during pregnancy. It is unknown if it is excreted in breast milk.
Erythromycin Counseling:  I discussed with the patient the risks of erythromycin including but not limited to GI upset, allergic reaction, drug rash, diarrhea, increase in liver enzymes, and yeast infections.
Topical Clindamycin Counseling: Patient counseled that this medication may cause skin irritation or allergic reactions.  In the event of skin irritation, the patient was advised to reduce the amount of the drug applied or use it less frequently.   The patient verbalized understanding of the proper use and possible adverse effects of clindamycin.  All of the patient's questions and concerns were addressed.
Spironolactone Counseling: Patient advised regarding risks of diarrhea, abdominal pain, hyperkalemia, birth defects (for female patients), liver toxicity and renal toxicity. The patient may need blood work to monitor liver and kidney function and potassium levels while on therapy. The patient verbalized understanding of the proper use and possible adverse effects of spironolactone.  All of the patient's questions and concerns were addressed.
Detail Level: Zone
Azithromycin Pregnancy And Lactation Text: This medication is considered safe during pregnancy and is also secreted in breast milk.
Birth Control Pills Counseling: Birth Control Pill Counseling: I discussed with the patient the potential side effects of OCPs including but not limited to increased risk of stroke, heart attack, thrombophlebitis, deep venous thrombosis, hepatic adenomas, breast changes, GI upset, headaches, and depression.  The patient verbalized understanding of the proper use and possible adverse effects of OCPs. All of the patient's questions and concerns were addressed.
Spironolactone Pregnancy And Lactation Text: This medication can cause feminization of the male fetus and should be avoided during pregnancy. The active metabolite is also found in breast milk.
Aklief counseling:  Patient advised to apply a pea-sized amount only at bedtime and wait 30 minutes after washing their face before applying.  If too drying, patient may add a non-comedogenic moisturizer.  The most commonly reported side effects including irritation, redness, scaling, dryness, stinging, burning, itching, and increased risk of sunburn.  The patient verbalized understanding of the proper use and possible adverse effects of retinoids.  All of the patient's questions and concerns were addressed.

## 2023-02-03 ENCOUNTER — HOSPITAL ENCOUNTER (OUTPATIENT)
Dept: MAMMOGRAPHY | Facility: CLINIC | Age: 33
Discharge: HOME | End: 2023-02-03
Attending: INTERNAL MEDICINE | Admitting: INTERNAL MEDICINE
Payer: COMMERCIAL

## 2023-02-03 DIAGNOSIS — Z80.3 FAMILY HISTORY OF MALIGNANT NEOPLASM OF BREAST: ICD-10-CM

## 2023-02-03 PROCEDURE — 77067 SCR MAMMO BI INCL CAD: CPT

## 2023-03-26 ENCOUNTER — HEALTH MAINTENANCE LETTER (OUTPATIENT)
Age: 33
End: 2023-03-26

## 2024-05-26 ENCOUNTER — HEALTH MAINTENANCE LETTER (OUTPATIENT)
Age: 34
End: 2024-05-26

## 2025-06-14 ENCOUNTER — HEALTH MAINTENANCE LETTER (OUTPATIENT)
Age: 35
End: 2025-06-14